# Patient Record
Sex: MALE | Race: WHITE | NOT HISPANIC OR LATINO | URBAN - METROPOLITAN AREA
[De-identification: names, ages, dates, MRNs, and addresses within clinical notes are randomized per-mention and may not be internally consistent; named-entity substitution may affect disease eponyms.]

---

## 2017-04-11 ENCOUNTER — OFFICE VISIT - HEALTHEAST (OUTPATIENT)
Dept: GERIATRICS | Facility: CLINIC | Age: 82
End: 2017-04-11

## 2017-04-11 ENCOUNTER — AMBULATORY - HEALTHEAST (OUTPATIENT)
Dept: ADMINISTRATIVE | Facility: CLINIC | Age: 82
End: 2017-04-11

## 2017-04-11 DIAGNOSIS — I25.10 CORONARY ARTERY DISEASE: ICD-10-CM

## 2017-04-11 DIAGNOSIS — E03.9 HYPOTHYROIDISM: ICD-10-CM

## 2017-04-11 DIAGNOSIS — I10 HYPERTENSION: ICD-10-CM

## 2017-04-11 DIAGNOSIS — N18.9 CHRONIC KIDNEY DISEASE: ICD-10-CM

## 2017-04-11 DIAGNOSIS — E11.9 DIABETES MELLITUS (H): ICD-10-CM

## 2017-04-11 DIAGNOSIS — I50.40 COMBINED CONGESTIVE SYSTOLIC AND DIASTOLIC HEART FAILURE (H): ICD-10-CM

## 2017-04-14 ENCOUNTER — OFFICE VISIT - HEALTHEAST (OUTPATIENT)
Dept: GERIATRICS | Facility: CLINIC | Age: 82
End: 2017-04-14

## 2017-04-14 DIAGNOSIS — I10 ESSENTIAL HYPERTENSION: ICD-10-CM

## 2017-04-14 DIAGNOSIS — I48.91 A-FIB (H): ICD-10-CM

## 2017-04-14 DIAGNOSIS — Z89.511 S/P BILATERAL BKA (BELOW KNEE AMPUTATION) (H): ICD-10-CM

## 2017-04-14 DIAGNOSIS — I50.9 CHF (CONGESTIVE HEART FAILURE) (H): ICD-10-CM

## 2017-04-14 DIAGNOSIS — E03.9 HYPOTHYROIDISM: ICD-10-CM

## 2017-04-14 DIAGNOSIS — Z89.512 S/P BILATERAL BKA (BELOW KNEE AMPUTATION) (H): ICD-10-CM

## 2017-04-14 DIAGNOSIS — N40.0 BPH (BENIGN PROSTATIC HYPERPLASIA): ICD-10-CM

## 2017-04-14 DIAGNOSIS — E11.9 DIABETES MELLITUS (H): ICD-10-CM

## 2017-04-18 ENCOUNTER — OFFICE VISIT - HEALTHEAST (OUTPATIENT)
Dept: GERIATRICS | Facility: CLINIC | Age: 82
End: 2017-04-18

## 2017-04-18 DIAGNOSIS — N18.9 CKD (CHRONIC KIDNEY DISEASE): ICD-10-CM

## 2017-04-18 DIAGNOSIS — R79.89 AZOTEMIA: ICD-10-CM

## 2017-04-18 DIAGNOSIS — I50.9 CHF (CONGESTIVE HEART FAILURE) (H): ICD-10-CM

## 2017-04-18 DIAGNOSIS — I10 ESSENTIAL HYPERTENSION: ICD-10-CM

## 2017-04-21 ENCOUNTER — OFFICE VISIT - HEALTHEAST (OUTPATIENT)
Dept: GERIATRICS | Facility: CLINIC | Age: 82
End: 2017-04-21

## 2017-04-21 DIAGNOSIS — E87.6 HYPOKALEMIA: ICD-10-CM

## 2017-04-21 DIAGNOSIS — N40.0 BPH (BENIGN PROSTATIC HYPERPLASIA): ICD-10-CM

## 2017-04-21 DIAGNOSIS — R19.5 LOOSE STOOLS: ICD-10-CM

## 2017-04-21 DIAGNOSIS — N18.9 CKD (CHRONIC KIDNEY DISEASE): ICD-10-CM

## 2017-04-25 ENCOUNTER — OFFICE VISIT - HEALTHEAST (OUTPATIENT)
Dept: GERIATRICS | Facility: CLINIC | Age: 82
End: 2017-04-25

## 2017-04-25 DIAGNOSIS — N18.9 CKD (CHRONIC KIDNEY DISEASE): ICD-10-CM

## 2017-04-25 DIAGNOSIS — E11.9 DIABETES MELLITUS (H): ICD-10-CM

## 2017-04-25 DIAGNOSIS — I10 ESSENTIAL HYPERTENSION: ICD-10-CM

## 2017-04-25 DIAGNOSIS — I50.9 CHF (CONGESTIVE HEART FAILURE) (H): ICD-10-CM

## 2017-04-28 ENCOUNTER — OFFICE VISIT - HEALTHEAST (OUTPATIENT)
Dept: GERIATRICS | Facility: CLINIC | Age: 82
End: 2017-04-28

## 2017-04-28 DIAGNOSIS — Z51.81 ANTICOAGULATION MANAGEMENT ENCOUNTER: ICD-10-CM

## 2017-04-28 DIAGNOSIS — Z79.01 ANTICOAGULATION MANAGEMENT ENCOUNTER: ICD-10-CM

## 2017-04-28 DIAGNOSIS — I48.91 A-FIB (H): ICD-10-CM

## 2017-04-28 DIAGNOSIS — N18.9 CKD (CHRONIC KIDNEY DISEASE): ICD-10-CM

## 2017-04-28 DIAGNOSIS — I50.9 CHF (CONGESTIVE HEART FAILURE) (H): ICD-10-CM

## 2017-04-28 DIAGNOSIS — E87.6 HYPOKALEMIA: ICD-10-CM

## 2017-04-28 DIAGNOSIS — Z89.511 S/P BILATERAL BKA (BELOW KNEE AMPUTATION) (H): ICD-10-CM

## 2017-04-28 DIAGNOSIS — Z89.512 S/P BILATERAL BKA (BELOW KNEE AMPUTATION) (H): ICD-10-CM

## 2017-05-02 ENCOUNTER — OFFICE VISIT - HEALTHEAST (OUTPATIENT)
Dept: GERIATRICS | Facility: CLINIC | Age: 82
End: 2017-05-02

## 2017-05-02 DIAGNOSIS — I10 ESSENTIAL HYPERTENSION: ICD-10-CM

## 2017-05-02 DIAGNOSIS — E11.9 DIABETES MELLITUS (H): ICD-10-CM

## 2017-05-02 DIAGNOSIS — I50.9 CHF (CONGESTIVE HEART FAILURE) (H): ICD-10-CM

## 2017-05-02 DIAGNOSIS — N18.9 CKD (CHRONIC KIDNEY DISEASE): ICD-10-CM

## 2017-05-05 ENCOUNTER — OFFICE VISIT - HEALTHEAST (OUTPATIENT)
Dept: GERIATRICS | Facility: CLINIC | Age: 82
End: 2017-05-05

## 2017-05-05 DIAGNOSIS — Z89.511 S/P BILATERAL BKA (BELOW KNEE AMPUTATION) (H): ICD-10-CM

## 2017-05-05 DIAGNOSIS — R53.81 PHYSICAL DECONDITIONING: ICD-10-CM

## 2017-05-05 DIAGNOSIS — E11.9 DIABETES MELLITUS (H): ICD-10-CM

## 2017-05-05 DIAGNOSIS — Z89.512 S/P BILATERAL BKA (BELOW KNEE AMPUTATION) (H): ICD-10-CM

## 2017-05-05 DIAGNOSIS — I10 ESSENTIAL HYPERTENSION: ICD-10-CM

## 2017-05-09 ENCOUNTER — OFFICE VISIT - HEALTHEAST (OUTPATIENT)
Dept: GERIATRICS | Facility: CLINIC | Age: 82
End: 2017-05-09

## 2017-05-09 DIAGNOSIS — E11.9 DIABETES MELLITUS (H): ICD-10-CM

## 2017-05-09 DIAGNOSIS — I50.9 CHF (CONGESTIVE HEART FAILURE) (H): ICD-10-CM

## 2017-05-09 DIAGNOSIS — N18.9 CKD (CHRONIC KIDNEY DISEASE): ICD-10-CM

## 2017-05-09 DIAGNOSIS — I10 ESSENTIAL HYPERTENSION: ICD-10-CM

## 2017-05-15 ENCOUNTER — AMBULATORY - HEALTHEAST (OUTPATIENT)
Dept: ADMINISTRATIVE | Facility: CLINIC | Age: 82
End: 2017-05-15

## 2017-05-16 ENCOUNTER — OFFICE VISIT - HEALTHEAST (OUTPATIENT)
Dept: GERIATRICS | Facility: CLINIC | Age: 82
End: 2017-05-16

## 2017-05-16 DIAGNOSIS — N40.0 BENIGN PROSTATIC HYPERTROPHY: ICD-10-CM

## 2017-05-16 DIAGNOSIS — Z89.511 S/P BILATERAL BKA (BELOW KNEE AMPUTATION) (H): ICD-10-CM

## 2017-05-16 DIAGNOSIS — N18.9 CHRONIC KIDNEY DISEASE: ICD-10-CM

## 2017-05-16 DIAGNOSIS — Z51.81 ANTICOAGULATION MANAGEMENT ENCOUNTER: ICD-10-CM

## 2017-05-16 DIAGNOSIS — I48.91 A-FIB (H): ICD-10-CM

## 2017-05-16 DIAGNOSIS — R31.0 GROSS HEMATURIA: ICD-10-CM

## 2017-05-16 DIAGNOSIS — Z79.01 ANTICOAGULATION MANAGEMENT ENCOUNTER: ICD-10-CM

## 2017-05-16 DIAGNOSIS — Z89.512 S/P BILATERAL BKA (BELOW KNEE AMPUTATION) (H): ICD-10-CM

## 2017-05-16 DIAGNOSIS — I10 ESSENTIAL HYPERTENSION: ICD-10-CM

## 2017-05-16 DIAGNOSIS — E11.9 TYPE 2 DIABETES MELLITUS (H): ICD-10-CM

## 2017-05-16 DIAGNOSIS — I50.9 CHF (CONGESTIVE HEART FAILURE) (H): ICD-10-CM

## 2017-05-19 ENCOUNTER — OFFICE VISIT - HEALTHEAST (OUTPATIENT)
Dept: GERIATRICS | Facility: CLINIC | Age: 82
End: 2017-05-19

## 2017-05-19 DIAGNOSIS — I50.9 CHF (CONGESTIVE HEART FAILURE) (H): ICD-10-CM

## 2017-05-19 DIAGNOSIS — E11.9 DIABETES MELLITUS (H): ICD-10-CM

## 2017-05-19 DIAGNOSIS — N18.9 CKD (CHRONIC KIDNEY DISEASE): ICD-10-CM

## 2017-05-19 DIAGNOSIS — R53.81 PHYSICAL DECONDITIONING: ICD-10-CM

## 2017-05-23 ENCOUNTER — OFFICE VISIT - HEALTHEAST (OUTPATIENT)
Dept: GERIATRICS | Facility: CLINIC | Age: 82
End: 2017-05-23

## 2017-05-23 DIAGNOSIS — I50.9 CHF (CONGESTIVE HEART FAILURE) (H): ICD-10-CM

## 2017-05-23 DIAGNOSIS — I10 ESSENTIAL HYPERTENSION: ICD-10-CM

## 2017-05-23 DIAGNOSIS — E11.9 DIABETES MELLITUS (H): ICD-10-CM

## 2017-05-23 DIAGNOSIS — R53.81 PHYSICAL DECONDITIONING: ICD-10-CM

## 2017-05-26 ENCOUNTER — OFFICE VISIT - HEALTHEAST (OUTPATIENT)
Dept: GERIATRICS | Facility: CLINIC | Age: 82
End: 2017-05-26

## 2017-05-26 DIAGNOSIS — I48.91 A-FIB (H): ICD-10-CM

## 2017-05-26 DIAGNOSIS — I50.9 CHF (CONGESTIVE HEART FAILURE) (H): ICD-10-CM

## 2017-05-26 DIAGNOSIS — I10 ESSENTIAL HYPERTENSION: ICD-10-CM

## 2017-05-26 DIAGNOSIS — E11.9 DIABETES MELLITUS (H): ICD-10-CM

## 2017-05-30 ENCOUNTER — OFFICE VISIT - HEALTHEAST (OUTPATIENT)
Dept: GERIATRICS | Facility: CLINIC | Age: 82
End: 2017-05-30

## 2017-05-30 DIAGNOSIS — E11.9 DIABETES MELLITUS (H): ICD-10-CM

## 2017-05-30 DIAGNOSIS — N40.0 BPH (BENIGN PROSTATIC HYPERPLASIA): ICD-10-CM

## 2017-05-30 DIAGNOSIS — R53.81 PHYSICAL DECONDITIONING: ICD-10-CM

## 2017-05-30 DIAGNOSIS — I50.9 CHF (CONGESTIVE HEART FAILURE) (H): ICD-10-CM

## 2017-06-02 ENCOUNTER — OFFICE VISIT - HEALTHEAST (OUTPATIENT)
Dept: GERIATRICS | Facility: CLINIC | Age: 82
End: 2017-06-02

## 2017-06-02 DIAGNOSIS — E11.9 DIABETES MELLITUS (H): ICD-10-CM

## 2017-06-02 DIAGNOSIS — R33.9 URINARY RETENTION: ICD-10-CM

## 2017-06-02 DIAGNOSIS — Z89.511 S/P BILATERAL BKA (BELOW KNEE AMPUTATION) (H): ICD-10-CM

## 2017-06-02 DIAGNOSIS — Z89.512 S/P BILATERAL BKA (BELOW KNEE AMPUTATION) (H): ICD-10-CM

## 2017-06-02 DIAGNOSIS — N40.0 BPH (BENIGN PROSTATIC HYPERPLASIA): ICD-10-CM

## 2017-06-06 ENCOUNTER — OFFICE VISIT - HEALTHEAST (OUTPATIENT)
Dept: GERIATRICS | Facility: CLINIC | Age: 82
End: 2017-06-06

## 2017-06-06 DIAGNOSIS — R53.1 GENERAL WEAKNESS: ICD-10-CM

## 2017-06-06 DIAGNOSIS — R33.9 URINARY RETENTION: ICD-10-CM

## 2017-06-06 DIAGNOSIS — I10 ESSENTIAL HYPERTENSION: ICD-10-CM

## 2017-06-06 DIAGNOSIS — E11.9 DIABETES MELLITUS (H): ICD-10-CM

## 2017-06-06 DIAGNOSIS — N40.0 BPH (BENIGN PROSTATIC HYPERPLASIA): ICD-10-CM

## 2017-06-09 ENCOUNTER — OFFICE VISIT - HEALTHEAST (OUTPATIENT)
Dept: GERIATRICS | Facility: CLINIC | Age: 82
End: 2017-06-09

## 2017-06-09 DIAGNOSIS — I48.91 A-FIB (H): ICD-10-CM

## 2017-06-09 DIAGNOSIS — E11.9 DIABETES MELLITUS (H): ICD-10-CM

## 2017-06-09 DIAGNOSIS — N40.0 BPH (BENIGN PROSTATIC HYPERPLASIA): ICD-10-CM

## 2017-06-09 DIAGNOSIS — R33.9 URINARY RETENTION: ICD-10-CM

## 2017-06-13 ENCOUNTER — OFFICE VISIT - HEALTHEAST (OUTPATIENT)
Dept: GERIATRICS | Facility: CLINIC | Age: 82
End: 2017-06-13

## 2017-06-13 DIAGNOSIS — E11.9 DIABETES MELLITUS (H): ICD-10-CM

## 2017-06-13 DIAGNOSIS — I10 ESSENTIAL HYPERTENSION: ICD-10-CM

## 2017-06-13 DIAGNOSIS — N40.0 BPH (BENIGN PROSTATIC HYPERPLASIA): ICD-10-CM

## 2017-06-13 DIAGNOSIS — R33.9 URINARY RETENTION: ICD-10-CM

## 2017-06-16 ENCOUNTER — OFFICE VISIT - HEALTHEAST (OUTPATIENT)
Dept: GERIATRICS | Facility: CLINIC | Age: 82
End: 2017-06-16

## 2017-06-16 DIAGNOSIS — Z97.8 CHRONIC INDWELLING FOLEY CATHETER: ICD-10-CM

## 2017-06-16 DIAGNOSIS — R33.9 URINARY RETENTION: ICD-10-CM

## 2017-06-16 DIAGNOSIS — E11.9 DIABETES MELLITUS (H): ICD-10-CM

## 2017-06-16 DIAGNOSIS — I10 ESSENTIAL HYPERTENSION: ICD-10-CM

## 2017-06-20 ENCOUNTER — OFFICE VISIT - HEALTHEAST (OUTPATIENT)
Dept: GERIATRICS | Facility: CLINIC | Age: 82
End: 2017-06-20

## 2017-06-20 DIAGNOSIS — Z97.8 CHRONIC INDWELLING FOLEY CATHETER: ICD-10-CM

## 2017-06-20 DIAGNOSIS — R53.81 PHYSICAL DECONDITIONING: ICD-10-CM

## 2017-06-20 DIAGNOSIS — E11.9 DIABETES MELLITUS (H): ICD-10-CM

## 2017-06-20 DIAGNOSIS — I10 ESSENTIAL HYPERTENSION: ICD-10-CM

## 2017-06-23 ENCOUNTER — OFFICE VISIT - HEALTHEAST (OUTPATIENT)
Dept: GERIATRICS | Facility: CLINIC | Age: 82
End: 2017-06-23

## 2017-06-23 DIAGNOSIS — E11.9 DIABETES MELLITUS (H): ICD-10-CM

## 2017-06-23 DIAGNOSIS — Z97.8 CHRONIC INDWELLING FOLEY CATHETER: ICD-10-CM

## 2017-06-23 DIAGNOSIS — R53.81 PHYSICAL DECONDITIONING: ICD-10-CM

## 2017-06-23 DIAGNOSIS — I50.9 CHF (CONGESTIVE HEART FAILURE) (H): ICD-10-CM

## 2017-06-27 ENCOUNTER — OFFICE VISIT - HEALTHEAST (OUTPATIENT)
Dept: GERIATRICS | Facility: CLINIC | Age: 82
End: 2017-06-27

## 2017-06-27 DIAGNOSIS — N18.9 CKD (CHRONIC KIDNEY DISEASE): ICD-10-CM

## 2017-06-27 DIAGNOSIS — E11.9 DIABETES MELLITUS (H): ICD-10-CM

## 2017-06-27 DIAGNOSIS — I10 ESSENTIAL HYPERTENSION: ICD-10-CM

## 2017-06-27 DIAGNOSIS — Z97.8 CHRONIC INDWELLING FOLEY CATHETER: ICD-10-CM

## 2017-06-29 ENCOUNTER — AMBULATORY - HEALTHEAST (OUTPATIENT)
Dept: GERIATRICS | Facility: CLINIC | Age: 82
End: 2017-06-29

## 2021-05-31 VITALS — BODY MASS INDEX: 30.4 KG/M2 | WEIGHT: 218 LBS

## 2021-06-10 NOTE — PROGRESS NOTES
[facility]    Facility:   [facility] [[id number]]   Code Status: FULL CODE      CHIEF COMPLAINT/REASON FOR VISIT:  Chief Complaint   Patient presents with     Follow Up     rehab       HISTORY:      HPI: Maxi is a 81 y.o. male who I had a chance to revisit with again today secondary to his chronic medical conditions including his most recent hospitalization March 22 secondary to acute on chronic combined systolic and diastolic heart failure in the setting of CAD.  He currently has been improving getting a little more therapy.  Recently did see the rectal doctor secondary to questionable hemorrhoid they did give him some ointments.  He does state it does feel better.  He does have CKD recheck in the [facility] on March 8.  He also is on Coumadin 3 mg.  He also has hypokalemia recently potassium was increased 30 mEq 3 times a day.  Blood sugars .    Past Medical History:   Diagnosis Date     Abnormality of gait      Acute on chronic combined systolic (congestive) and diastolic (congestive) heart failure      Biventricular ICD (implantable cardioverter-defibrillator) in place      CAD (coronary artery disease)      CKD (chronic kidney disease), stage III      Closed nondisplaced fracture of left ischium      Diabetes mellitus      Epigastric pain      Gallstone pancreatitis      GI bleed      Gouty arthropathy      Hyperlipidemia      Hypertension      Hyponatremia      Hypothyroidism      Insomnia      Ischemic dilated cardiomyopathy      Mitral insufficiency      Neuropathy, diabetic      Non-rheumatic tricuspid valve insufficiency      Obesity      Paroxysmal atrial fibrillation      Peripheral artery disease      Reaven's syndrome      S/P bilateral below knee amputation      Sleep apnea      Urinary retention              Family History   Problem Relation Age of Onset     Other Other      there is a positive family hx of coronary disease     Social History     Social  History     Marital status:      Spouse name: N/A     Number of children: N/A     Years of education: N/A     Social History Main Topics     Smoking status: Former Smoker     Packs/day: 2.00     Years: 3.00     Types: Cigarettes     Quit date: 1/1/1957     Smokeless tobacco: Never Used     Alcohol use No     Drug use: No     Sexual activity: Not on file     Other Topics Concern     Not on file     Social History Narrative         Review of Systems  Currently denies chills fever coughing wheezing chest pain dizziness flulike symptoms rashes sores stiff neck or swollen glands. A history of combined systolic and diastolic congestive heart failure diabetes hypertension A. fib chronic kidney disease hypothyroidism BPH.    Current Outpatient Prescriptions   Medication Sig     acetaminophen (TYLENOL) 650 MG CR tablet Take 650 mg by mouth every 4 (four) hours as needed.     allopurinol (ZYLOPRIM) 300 MG tablet Take 300 mg by mouth daily.     aspirin 81 mg chewable tablet Chew 81 mg daily.     atorvastatin (LIPITOR) 20 MG tablet Take 20 mg by mouth daily with supper.     bumetanide (BUMEX) 2 MG tablet Take 4 mg by mouth 2 (two) times a day.     carvedilol (COREG) 25 MG tablet Take 12.5 mg by mouth 2 (two) times a day with meals.     clopidogrel (PLAVIX) 75 mg tablet Take 75 mg by mouth daily.     finasteride (PROSCAR) 5 mg tablet Take 5 mg by mouth daily.     HYDROcodone-acetaminophen 5-325 mg per tablet Take 2 tablets by mouth every 8 (eight) hours as needed.     insulin NPH human recomb (NOVOLIN N INNOLET/HUMULIN N KWIKPEN) 100 unit/mL (3 mL) pen Inject 18 Units under the skin daily before supper.     insulin NPH human recomb (NOVOLIN N INNOLET/HUMULIN N KWIKPEN) 100 unit/mL (3 mL) pen Inject 24 Units under the skin Daily before breakfast.     insulin regular (NOVOLIN R) 100 unit/mL injection Inject 10 Units under the skin 2 (two) times a day before meals.     isosorbide mononitrate (IMDUR) 60 MG 24 hr tablet Take  60 mg by mouth daily.     levothyroxine (SYNTHROID, LEVOTHROID) 25 MCG tablet Take 50 mcg by mouth daily.     magnesium oxide (MAG-OX) 400 mg tablet Take 400 mg by mouth daily.     miconazole (MICOTIN) 2 % powder Apply 1 application topically 2 (two) times a day.     nitroglycerin (NITROSTAT) 0.4 MG SL tablet Place 0.4 mg under the tongue every 5 (five) minutes as needed.     nystatin, bulk, 10 billion unit Powd Apply 1 application topically 2 (two) times a day as needed. Apply to  bilateral groin until rash resolved     peg 400-propylene glycol (SYSTANE) 0.4-0.3 % Drop Administer 1 drop to both eyes every hour as needed.     potassium chloride SA (K-DUR,KLOR-CON) 20 MEQ tablet Take 20 mEq by mouth 2 (two) times a day. \     tamsulosin (FLOMAX) 0.4 mg Cp24 Take 0.4 mg by mouth 2 (two) times a day.     traZODone (DESYREL) 50 MG tablet Take 100 mg by mouth daily.     WARFARIN SODIUM (WARFARIN ORAL) Take by mouth. 5mg on Sun, Wed, Sat; 2.5 mg all other days       .  Vitals:    05/02/17 2036   BP: 113/68   Pulse: 90   Resp: 18   Temp: 97.6  F (36.4  C)       Physical Exam   Constitutional: No distress.   HENT:   Head: Normocephalic.   Eyes: Pupils are equal, round, and reactive to light.   Neck: Neck supple. No thyromegaly present.   Cardiovascular:   Irregularity. Mitral valve.   Pulmonary/Chest: Breath sounds normal.   Abdominal: Bowel sounds are normal. There is no tenderness. There is no guarding.   Genitourinary:   Genitourinary Comments: BPH. lino  Musculoskeletal:   Bilateral below-knee amputations with prosthetics. Generalized weakness.   Lymphadenopathy:   He has no cervical adenopathy.   Neurological: He is alert.   Skin: Skin is warm and dry. No rash noted.      LABS:   Results for orders placed or performed in visit on 05/02/17   Basic Metabolic Panel   Result Value Ref Range    Sodium 137 136 - 145 mmol/L    Potassium 4.2 3.5 - 5.0 mmol/L    Chloride 93 (L) 98 - 107 mmol/L    CO2 36 (H) 22 - 31 mmol/L     Anion Gap, Calculation 8 5 - 18 mmol/L    Glucose 220 (H) 70 - 125 mg/dL    Calcium 9.2 8.5 - 10.5 mg/dL    BUN 74 (H) 8 - 28 mg/dL    Creatinine 1.55 (H) 0.70 - 1.30 mg/dL    GFR MDRD Af Amer 52 (L) >60 mL/min/1.73m2    GFR MDRD Non Af Amer 43 (L) >60 mL/min/1.73m2       Lab Results   Component Value Date    WBC 9.5 06/16/2016    HGB 10.8 (L) 04/25/2017    HCT 34.9 (L) 06/16/2016    MCV 99 06/16/2016     06/16/2016         ASSESSMENT:      ICD-10-CM    1. CKD (chronic kidney disease) N18.9    2. Diabetes mellitus E11.9    3. Essential hypertension I10    4. CHF (congestive heart failure) I50.9        PLAN:    Recheck in the [facility] on May 8.  Continue to manage and follow.  Also continue to monitor blood sugars blood pressures and overall status including therapy.      Electronically signed by: Michael Duane Johnson, CNP

## 2021-06-10 NOTE — PROGRESS NOTES
[facility]    Facility:   [facility] [[id number]]   Code Status: FULL CODE      CHIEF COMPLAINT/REASON FOR VISIT:  Chief Complaint   Patient presents with     Follow Up     rehab       HISTORY:      HPI: Maxi is a 81 y.o. male who I had the pleasure of visiting with today secondary to his hospitalization March 22 secondary to acute on chronic combined systolic and diastolic heart failure in the setting of CAD.  Currently enrolled in therapy.  He does feel he is making pretty good progress.  No CHF exacerbation.  I think at this point he is actually doing quite well we will change his Bumex to 2 mg twice daily also decrease potassium 30 mEq twice daily rather than 3 times daily.  He has been normotensive and afebrile.  Uses trazodone for sleep.  Does have urinary retention and a Bullock catheter and does take Flomax.  Did have some laboratory studies done on May 8 see results below I did talk about that.  Also was on Coumadin 2.5 mg recheck pro time on May 12.  Does not use oxygen during the day will use it at night.    Past Medical History:   Diagnosis Date     Abnormality of gait      Acute on chronic combined systolic (congestive) and diastolic (congestive) heart failure      Biventricular ICD (implantable cardioverter-defibrillator) in place      CAD (coronary artery disease)      CKD (chronic kidney disease), stage III      Closed nondisplaced fracture of left ischium      Diabetes mellitus      Epigastric pain      Gallstone pancreatitis      GI bleed      Gouty arthropathy      Hyperlipidemia      Hypertension      Hyponatremia      Hypothyroidism      Insomnia      Ischemic dilated cardiomyopathy      Mitral insufficiency      Neuropathy, diabetic      Non-rheumatic tricuspid valve insufficiency      Obesity      Paroxysmal atrial fibrillation      Peripheral artery disease      Reaven's syndrome      S/P bilateral below knee amputation      Sleep apnea      Urinary  retention              Family History   Problem Relation Age of Onset     Other Other      there is a positive family hx of coronary disease     Social History     Social History     Marital status:      Spouse name: N/A     Number of children: N/A     Years of education: N/A     Social History Main Topics     Smoking status: Former Smoker     Packs/day: 2.00     Years: 3.00     Types: Cigarettes     Quit date: 1/1/1957     Smokeless tobacco: Never Used     Alcohol use No     Drug use: No     Sexual activity: Not on file     Other Topics Concern     Not on file     Social History Narrative         Review of Systems  Currently denies chills fever coughing wheezing chest pain dizziness flulike symptoms rashes sores stiff neck or swollen glands. A history of combined systolic and diastolic congestive heart failure diabetes hypertension A. fib chronic kidney disease hypothyroidism BPH.    Current Outpatient Prescriptions   Medication Sig     acetaminophen (TYLENOL) 650 MG CR tablet Take 650 mg by mouth every 4 (four) hours as needed.     allopurinol (ZYLOPRIM) 300 MG tablet Take 300 mg by mouth daily.     aspirin 81 mg chewable tablet Chew 81 mg daily.     atorvastatin (LIPITOR) 20 MG tablet Take 20 mg by mouth daily with supper.     bumetanide (BUMEX) 2 MG tablet Take 4 mg by mouth 2 (two) times a day.     carvedilol (COREG) 25 MG tablet Take 12.5 mg by mouth 2 (two) times a day with meals.     clopidogrel (PLAVIX) 75 mg tablet Take 75 mg by mouth daily.     finasteride (PROSCAR) 5 mg tablet Take 5 mg by mouth daily.     HYDROcodone-acetaminophen 5-325 mg per tablet Take 2 tablets by mouth every 8 (eight) hours as needed.     insulin NPH human recomb (NOVOLIN N INNOLET/HUMULIN N KWIKPEN) 100 unit/mL (3 mL) pen Inject 18 Units under the skin daily before supper.     insulin NPH human recomb (NOVOLIN N INNOLET/HUMULIN N KWIKPEN) 100 unit/mL (3 mL) pen Inject 24 Units under the skin Daily before breakfast.      insulin regular (NOVOLIN R) 100 unit/mL injection Inject 10 Units under the skin 2 (two) times a day before meals.     isosorbide mononitrate (IMDUR) 60 MG 24 hr tablet Take 60 mg by mouth daily.     levothyroxine (SYNTHROID, LEVOTHROID) 25 MCG tablet Take 50 mcg by mouth daily.     magnesium oxide (MAG-OX) 400 mg tablet Take 400 mg by mouth daily.     miconazole (MICOTIN) 2 % powder Apply 1 application topically 2 (two) times a day.     nitroglycerin (NITROSTAT) 0.4 MG SL tablet Place 0.4 mg under the tongue every 5 (five) minutes as needed.     nystatin, bulk, 10 billion unit Powd Apply 1 application topically 2 (two) times a day as needed. Apply to  bilateral groin until rash resolved     peg 400-propylene glycol (SYSTANE) 0.4-0.3 % Drop Administer 1 drop to both eyes every hour as needed.     potassium chloride SA (K-DUR,KLOR-CON) 20 MEQ tablet Take 20 mEq by mouth 2 (two) times a day. \     tamsulosin (FLOMAX) 0.4 mg Cp24 Take 0.4 mg by mouth 2 (two) times a day.     traZODone (DESYREL) 50 MG tablet Take 100 mg by mouth daily.     WARFARIN SODIUM (WARFARIN ORAL) Take by mouth. 5mg on Sun, Wed, Sat; 2.5 mg all other days       .  Vitals:    05/09/17 1613   BP: 131/81   Pulse: 92   Resp: 18       Physical Exam  Constitutional: No distress.   HENT:   Head: Normocephalic.   Eyes: Pupils are equal, round, and reactive to light.   Neck: Neck supple. No thyromegaly present.   Cardiovascular:   Irregularity. Mitral valve.   Pulmonary/Chest: Breath sounds normal.   Abdominal: Bowel sounds are normal. There is no tenderness. There is no guarding.   Genitourinary:   Genitourinary Comments: BPH. lino  Musculoskeletal:   Bilateral below-knee amputations with prosthetics. Generalized weakness.   Lymphadenopathy:   He has no cervical adenopathy.   Neurological: He is alert.   Skin: Skin is warm and dry. No rash noted.       LABS:   Lab Results   Component Value Date    WBC 9.5 06/16/2016    HGB 10.8 (L) 04/25/2017    HCT  34.9 (L) 06/16/2016    MCV 99 06/16/2016     06/16/2016     Results for orders placed or performed in visit on 05/08/17   Basic Metabolic Panel   Result Value Ref Range    Sodium 137 136 - 145 mmol/L    Potassium 4.6 3.5 - 5.0 mmol/L    Chloride 97 (L) 98 - 107 mmol/L    CO2 29 22 - 31 mmol/L    Anion Gap, Calculation 11 5 - 18 mmol/L    Glucose 154 (H) 70 - 125 mg/dL    Calcium 9.0 8.5 - 10.5 mg/dL    BUN 54 (H) 8 - 28 mg/dL    Creatinine 1.57 (H) 0.70 - 1.30 mg/dL    GFR MDRD Af Amer 52 (L) >60 mL/min/1.73m2    GFR MDRD Non Af Amer 43 (L) >60 mL/min/1.73m2           ASSESSMENT:      ICD-10-CM    1. CHF (congestive heart failure) I50.9    2. Diabetes mellitus E11.9    3. CKD (chronic kidney disease) N18.9    4. Essential hypertension I10        PLAN:    Overall the BMP has improved.  We will now decrease the Bumex to 2 mg twice daily decreased potassium 30 mEq twice daily.  Next BMP on May 15.  Also Coumadin 2.5 mg recheck a pro time on May 12.        Electronically signed by: Michael Duane Johnson, CNP

## 2021-06-10 NOTE — PROGRESS NOTES
[facility]    Facility:   [facility] [[id number]]   Code Status: FULL CODE      CHIEF COMPLAINT/REASON FOR VISIT:  Chief Complaint   Patient presents with     Follow Up     labs, K, stool,        HISTORY:      HPI: Maxi is a 81 y.o. male who I had the pleasure of visiting with again secondary to his hospitalization March 22 - April 10 secondary to acute on chronic combined systolic and diastolic congestive heart failure in the setting of CAD and severe mitral regurgitation.  We been doing some laboratory studies on him and he does have hypokalemia.  Earlier in the week discontinue the metolazone gave him a couple extra doses of potassium 20 mEq currently is on 15 mEq twice daily will now increase it to 20 mEq twice daily and recheck again next week.  He did have another BMP this week comparable to the previous one on the 17th except that the creatinine is now down 1.68 where it was 1.99 at this point he is willing to trial an IV so we will give him a few liters over the weekend.  Also decreased his allopurinol 100 mg.  He has been normotensive and afebrile.  Does continue with the Bumex 2 mg twice a day.  They did a trial study for Bullock discontinuation he failed that to the Bullock is back in.  He is getting stool softeners did have loose stool last couple of days someone on the staff thought that there could be some blood in there is no history of hemorrhoid no pain through the rectum but due to the fact that someone recognize blood in his stool will send him off to gastroenterology.  Blood sugars ranging .  He is on NPH and regular insulin no changes.  His last hemoglobin the 17th was 10.3 will recheck again next week.  He is on Coumadin now at 3 mg rechecking next week.  Due to the kidney function test and he does admit now that he would like to trial an IV.  Earlier in the week he wanted to defer that we will go ahead and give him a couple of liters to at the D5 and  one half normal saline and then another one over the third 1 with adding 10 KCl and then recheck the BMP next week.  Not having any significant pain although does take Vicodin 1 or 2 doses per day.  For sleep is on trazodone 100 mg.    Past Medical History:   Diagnosis Date     Abnormality of gait      Acute on chronic combined systolic (congestive) and diastolic (congestive) heart failure      Biventricular ICD (implantable cardioverter-defibrillator) in place      CAD (coronary artery disease)      CKD (chronic kidney disease), stage III      Closed nondisplaced fracture of left ischium      Diabetes mellitus      Epigastric pain      Gallstone pancreatitis      GI bleed      Gouty arthropathy      Hyperlipidemia      Hypertension      Hyponatremia      Hypothyroidism      Insomnia      Ischemic dilated cardiomyopathy      Mitral insufficiency      Neuropathy, diabetic      Non-rheumatic tricuspid valve insufficiency      Obesity      Paroxysmal atrial fibrillation      Peripheral artery disease      Reaven's syndrome      S/P bilateral below knee amputation      Sleep apnea      Urinary retention              Family History   Problem Relation Age of Onset     Other Other      there is a positive family hx of coronary disease     Social History     Social History     Marital status:      Spouse name: N/A     Number of children: N/A     Years of education: N/A     Social History Main Topics     Smoking status: Former Smoker     Packs/day: 2.00     Years: 3.00     Types: Cigarettes     Quit date: 1/1/1957     Smokeless tobacco: Never Used     Alcohol use No     Drug use: No     Sexual activity: Not on file     Other Topics Concern     Not on file     Social History Narrative         Review of Systems  Currently denies chills fever coughing wheezing chest pain dizziness flulike symptoms rashes sores stiff neck or swollen glands. A history of combined systolic and diastolic congestive heart failure diabetes  hypertension A. fib chronic kidney disease hypothyroidism BPH.      Current Outpatient Prescriptions:      acetaminophen (TYLENOL) 650 MG CR tablet, Take 650 mg by mouth every 4 (four) hours as needed., Disp: , Rfl:      allopurinol (ZYLOPRIM) 300 MG tablet, Take 300 mg by mouth daily., Disp: , Rfl:      aspirin 81 mg chewable tablet, Chew 81 mg daily., Disp: , Rfl:      atorvastatin (LIPITOR) 20 MG tablet, Take 20 mg by mouth daily with supper., Disp: , Rfl:      bumetanide (BUMEX) 2 MG tablet, Take 4 mg by mouth 2 (two) times a day., Disp: , Rfl:      carvedilol (COREG) 25 MG tablet, Take 12.5 mg by mouth 2 (two) times a day with meals., Disp: , Rfl:      clopidogrel (PLAVIX) 75 mg tablet, Take 75 mg by mouth daily., Disp: , Rfl:      finasteride (PROSCAR) 5 mg tablet, Take 5 mg by mouth daily., Disp: , Rfl:      hydrALAZINE (APRESOLINE) 25 MG tablet, Take 12.5 mg by mouth 2 (two) times a day., Disp: , Rfl:      HYDROcodone-acetaminophen 5-325 mg per tablet, Take 2 tablets by mouth every 8 (eight) hours as needed., Disp: , Rfl:      insulin NPH human recomb (NOVOLIN N INNOLET/HUMULIN N KWIKPEN) 100 unit/mL (3 mL) pen, Inject 18 Units under the skin daily before supper., Disp: , Rfl:      insulin NPH human recomb (NOVOLIN N INNOLET/HUMULIN N KWIKPEN) 100 unit/mL (3 mL) pen, Inject 24 Units under the skin Daily before breakfast., Disp: , Rfl:      insulin regular (NOVOLIN R) 100 unit/mL injection, Inject 10 Units under the skin 2 (two) times a day before meals., Disp: , Rfl:      isosorbide mononitrate (IMDUR) 60 MG 24 hr tablet, Take 60 mg by mouth daily., Disp: , Rfl:      levothyroxine (SYNTHROID, LEVOTHROID) 25 MCG tablet, Take 50 mcg by mouth daily., Disp: , Rfl:      magnesium oxide (MAG-OX) 400 mg tablet, Take 400 mg by mouth daily., Disp: , Rfl:      miconazole (MICOTIN) 2 % powder, Apply 1 application topically 2 (two) times a day., Disp: , Rfl:      nitroglycerin (NITROSTAT) 0.4 MG SL tablet, Place 0.4 mg  under the tongue every 5 (five) minutes as needed., Disp: , Rfl:      nystatin, bulk, 10 billion unit Powd, Apply 1 application topically 2 (two) times a day as needed. Apply to  bilateral groin until rash resolved, Disp: , Rfl:      peg 400-propylene glycol (SYSTANE) 0.4-0.3 % Drop, Administer 1 drop to both eyes every hour as needed., Disp: , Rfl:      potassium chloride SA (K-DUR,KLOR-CON) 20 MEQ tablet, Take 20 mEq by mouth 2 (two) times a day. \, Disp: , Rfl:      tamsulosin (FLOMAX) 0.4 mg Cp24, Take 0.4 mg by mouth 2 (two) times a day., Disp: , Rfl:      traZODone (DESYREL) 50 MG tablet, Take 100 mg by mouth daily., Disp: , Rfl:      WARFARIN SODIUM (WARFARIN ORAL), Take by mouth. 5mg on Sun, Wed, Sat; 2.5 mg all other days, Disp: , Rfl:     .  Vitals:    04/21/17 0842   BP: 130/71   Pulse: 68   Resp: 20   Temp: 96.8  F (36  C)       Physical Exam  Constitutional: No distress.   HENT:   Head: Normocephalic.   Eyes: Pupils are equal, round, and reactive to light.   Neck: Neck supple. No thyromegaly present.   Cardiovascular:   Irregularity. Mitral valve.   Pulmonary/Chest: Breath sounds normal.   Abdominal: Bowel sounds are normal. There is no tenderness. There is no guarding.   Genitourinary:   Genitourinary Comments: BPH.   Musculoskeletal:   Bilateral below-knee amputations with prosthetics. Generalized weakness.   Lymphadenopathy:   He has no cervical adenopathy.   Neurological: He is alert.   Skin: Skin is warm and dry. No rash noted.      LABS:   Lab Results   Component Value Date    WBC 9.5 06/16/2016    HGB 10.1 (L) 04/17/2017    HCT 34.9 (L) 06/16/2016    MCV 99 06/16/2016     06/16/2016     Results for orders placed or performed in visit on 04/20/17   Basic Metabolic Panel   Result Value Ref Range    Sodium 135 (L) 136 - 145 mmol/L    Potassium 3.0 (L) 3.5 - 5.0 mmol/L    Chloride 80 (L) 98 - 107 mmol/L    CO2 45 (HH) 22 - 31 mmol/L    Anion Gap, Calculation 10 5 - 18 mmol/L    Glucose 150 (H)  70 - 125 mg/dL    Calcium 9.4 8.5 - 10.5 mg/dL     (H) 8 - 28 mg/dL    Creatinine 1.68 (H) 0.70 - 1.30 mg/dL    GFR MDRD Af Amer 48 (L) >60 mL/min/1.73m2    GFR MDRD Non Af Amer 39 (L) >60 mL/min/1.73m2    potassium level April 18 3.1.        ASSESSMENT:      ICD-10-CM    1. Hypokalemia E87.6    2. CKD (chronic kidney disease) N18.9    3. Loose stools R19.5    4. BPH (benign prostatic hyperplasia) N40.0        PLAN:    At this time we will give him an IV 3 L.  Continue to monitor the blood sugars the blood pressures his overall status his output also next week checking the hemoglobin and the BMP.  Coumadin 3 mg rechecking on the 25th a pro time.  And then also he still is on oxygen per nasal cannula.  He did fail a Bullock catheter trial removal.  Also send him off to gastroenterology for questionable melena.    For documentation purposes total visit was over 40 minutes to which over 50% was spent with the patient going over his labs his therapy his medications as well as overall current symptoms and also putting an IV fluids getting future laboratory studies as well as coordination of care.        Electronically signed by: Michael Duane Johnson, CNP

## 2021-06-10 NOTE — PROGRESS NOTES
[facility]    Facility:   [facility] [[id number]]   Code Status: FULL CODE      CHIEF COMPLAINT/REASON FOR VISIT:  Chief Complaint   Patient presents with     Follow Up     rehab       HISTORY:      HPI: Maxi is a 81 y.o. male who I had a chance to revisit with again today secondary to his most recent hospitalization May 11 secondary to hematuria.  He also has a history of combined systolic and diastolic heart failure secondary to ischemic dilated cardiomyopathy status post biventricular ICD secondary to severe tricuspid and mitral regurgitation.  He currently has been asymptomatic and his Bullock has been clear.  He still is on Bumex.  Last BMP was the 18th.  He is on Coumadin.  For sleep he is on trazodone.  For his diabetes blood sugars ranging 164-270 but most of them less than 200 and only right now he has been on sliding scale insulin.  We talked about his prior use of NPH rather than using sliding scale and at this point he feels comfortable just with the sliding scale but also being very much aware after our conversation today that we do not control diabetes with sliding scale insulin but he said he will follow-up soon when he gets discharged.  There is no discharge date yet set but he does feel he is doing well and therapy states that he is able to transfer he has improved significantly and he thinks he should be able to go home soon.    Past Medical History:   Diagnosis Date     Abnormality of gait      Acute on chronic combined systolic (congestive) and diastolic (congestive) heart failure      Biventricular ICD (implantable cardioverter-defibrillator) in place      BPH (benign prostatic hyperplasia)      CAD (coronary artery disease)      CKD (chronic kidney disease), stage III      Closed nondisplaced fracture of left ischium      Diabetes mellitus      Epigastric pain      Gallstone pancreatitis      GI bleed      Gouty arthropathy      Gross hematuria       Hyperlipidemia      Hypertension      Hyponatremia      Hypothyroidism      Insomnia      Ischemic dilated cardiomyopathy      Mitral insufficiency      Neuropathy, diabetic      Non-rheumatic tricuspid valve insufficiency      Obesity      Paroxysmal atrial fibrillation      Peripheral artery disease      Reaven's syndrome      S/P bilateral below knee amputation      Sleep apnea      Urinary retention              Family History   Problem Relation Age of Onset     Other Other      there is a positive family hx of coronary disease     Social History     Social History     Marital status:      Spouse name: N/A     Number of children: N/A     Years of education: N/A     Social History Main Topics     Smoking status: Former Smoker     Packs/day: 2.00     Years: 3.00     Types: Cigarettes     Quit date: 1/1/1957     Smokeless tobacco: Never Used     Alcohol use No     Drug use: No     Sexual activity: Not on file     Other Topics Concern     Not on file     Social History Narrative         Review of Systems  Currently denies chills fever coughing wheezing chest pain dizziness flulike symptoms rashes sores stiff neck or swollen glands. A history of combined systolic and diastolic congestive heart failure diabetes hypertension A. fib chronic kidney disease hypothyroidism BPH.        Current Outpatient Prescriptions:      acetaminophen (TYLENOL) 650 MG CR tablet, Take 650 mg by mouth every 8 (eight) hours as needed for pain. , Disp: , Rfl:      allopurinol (ZYLOPRIM) 300 MG tablet, Take 100 mg by mouth daily. , Disp: , Rfl:      atorvastatin (LIPITOR) 20 MG tablet, Take 20 mg by mouth daily with supper., Disp: , Rfl:      bumetanide (BUMEX) 2 MG tablet, Take 4 mg by mouth 2 (two) times a day., Disp: , Rfl:      carvedilol (COREG) 25 MG tablet, Take 12.5 mg by mouth 2 (two) times a day with meals., Disp: , Rfl:      cefdinir (OMNICEF) 300 MG capsule, Take 300 mg by mouth 2 (two) times a day., Disp: , Rfl:       clopidogrel (PLAVIX) 75 mg tablet, Take 75 mg by mouth daily., Disp: , Rfl:      finasteride (PROSCAR) 5 mg tablet, Take 5 mg by mouth daily., Disp: , Rfl:      insulin aspart (NOVOLOG) 100 unit/mL injection pen, 1u per 15g of carbohydrates plus sliding scale., Disp: , Rfl:      insulin aspart (NOVOLOG) 100 unit/mL injection pen, Inject under the skin 3 (three) times a day with meals. Per sliding scale: 70 - 149: No corrective insulin, 150 - 199:2 units, 200 - 249:4 units, 250 - 299: 6 units, 300 - 349: 8 units, 350 or greater:10 units and notify MD, Disp: , Rfl:      isosorbide mononitrate (IMDUR) 60 MG 24 hr tablet, Take 60 mg by mouth daily., Disp: , Rfl:      levothyroxine (SYNTHROID, LEVOTHROID) 25 MCG tablet, Take 50 mcg by mouth Daily before breakfast. , Disp: , Rfl:      magnesium oxide (MAG-OX) 400 mg tablet, Take 400 mg by mouth daily., Disp: , Rfl:      miconazole (MICOTIN) 2 % powder, Apply 1 application topically 2 (two) times a day., Disp: , Rfl:      nitroglycerin (NITROSTAT) 0.4 MG SL tablet, Place 0.4 mg under the tongue every 5 (five) minutes as needed., Disp: , Rfl:      nystatin, bulk, 10 billion unit Powd, Apply 1 application topically 2 (two) times a day as needed. Apply to  bilateral groin until rash resolved, Disp: , Rfl:      peg 400-propylene glycol (SYSTANE) 0.4-0.3 % Drop, Administer 1 drop to both eyes every hour as needed., Disp: , Rfl:      potassium chloride SA (K-DUR,KLOR-CON) 20 MEQ tablet, Take 30 mEq by mouth 2 (two) times a day with meals. , Disp: , Rfl:      senna (SENOKOT) 8.6 mg tablet, Take 1 tablet by mouth 2 (two) times a day., Disp: , Rfl:      SOD PHOS,M-B/NA PHOS,DI-BA (FLEET ENEMA RECT), Insert 1 enema into the rectum daily as needed., Disp: , Rfl:      tamsulosin (FLOMAX) 0.4 mg Cp24, Take 0.4 mg by mouth 2 (two) times a day., Disp: , Rfl:      traZODone (DESYREL) 50 MG tablet, Take 100 mg by mouth at bedtime. , Disp: , Rfl:      WARFARIN SODIUM (WARFARIN ORAL), Take  by mouth. 2.5mg daily  5mg on Sun, Wed, Sat; 2.5 mg all other days, Disp: , Rfl:   .  Vitals:    05/23/17 1704   BP: 112/62   Pulse: 93   Resp: 18   Temp: 98  F (36.7  C)       Physical Exam    Constitutional: No distress.   HENT:   Head: Normocephalic.   Eyes: Pupils are equal, round, and reactive to light.   Neck: Neck supple. No thyromegaly present.   Cardiovascular:   Irregularity. Mitral valve.   Pulmonary/Chest: Breath sounds normal.   Abdominal: Bowel sounds are normal. There is no tenderness. There is no guarding.   Genitourinary:   Genitourinary Comments: BPH. lino  Musculoskeletal:   Bilateral below-knee amputations with prosthetics. Generalized weakness.   Lymphadenopathy:   He has no cervical adenopathy.   Neurological: He is alert.   Skin: Skin is warm and dry. No rash noted.           LABS:   Results for orders placed or performed in visit on 05/18/17   Basic Metabolic Panel   Result Value Ref Range    Sodium 137 136 - 145 mmol/L    Potassium 4.2 3.5 - 5.0 mmol/L    Chloride 96 (L) 98 - 107 mmol/L    CO2 31 22 - 31 mmol/L    Anion Gap, Calculation 10 5 - 18 mmol/L    Glucose 169 (H) 70 - 125 mg/dL    Calcium 8.8 8.5 - 10.5 mg/dL    BUN 50 (H) 8 - 28 mg/dL    Creatinine 1.22 0.70 - 1.30 mg/dL    GFR MDRD Af Amer >60 >60 mL/min/1.73m2    GFR MDRD Non Af Amer 57 (L) >60 mL/min/1.73m2       Lab Results   Component Value Date    WBC 9.5 06/16/2016    HGB 10.8 (L) 04/25/2017    HCT 34.9 (L) 06/16/2016    MCV 99 06/16/2016     06/16/2016     No results found for: HGBA1C      ASSESSMENT:      ICD-10-CM    1. Diabetes mellitus E11.9    2. CHF (congestive heart failure) I50.9    3. Essential hypertension I10    4. Physical deconditioning R53.81        PLAN:    At this time I would like him to go back on his NPH but he would like to hold off on that for now.  His next pro time is May 24.  Continue to manage and follow.  His rectal fissure and also does seem to be improving.    Electronically signed by:  Michael Duane Johnson, CNP

## 2021-06-10 NOTE — PROGRESS NOTES
[facility]    Facility:   [facility] [[id number]]   Code Status: FULL CODE      CHIEF COMPLAINT/REASON FOR VISIT:  Chief Complaint   Patient presents with     Follow Up     rehab, dm       HISTORY:      HPI: Maxi is a 81 y.o. male who I had the pleasure to visit with.  Actually doing quite well making excellent progress.  Blood sugars running 1 .  Adjusting his Coumadin 3 mg rechecking a pro time next Friday the second.  Initially his slums was 18/30 they are going to recheck it since he does seem to be clearing quite nicely.  He has been normotensive and afebrile.  Overall he does feel like he has made excellent progress from his hospitalization May 11 secondary to history of systolic and diastolic heart failure secondary to ischemic dilated cardiomyopathy status post biventricular ICD secondary to tricuspid and mitral regurgitation.  Has a Bullock catheter.  No hematuria.    Past Medical History:   Diagnosis Date     Abnormality of gait      Acute on chronic combined systolic (congestive) and diastolic (congestive) heart failure      Biventricular ICD (implantable cardioverter-defibrillator) in place      BPH (benign prostatic hyperplasia)      CAD (coronary artery disease)      CKD (chronic kidney disease), stage III      Closed nondisplaced fracture of left ischium      Diabetes mellitus      Epigastric pain      Gallstone pancreatitis      GI bleed      Gouty arthropathy      Gross hematuria      Hyperlipidemia      Hypertension      Hyponatremia      Hypothyroidism      Insomnia      Ischemic dilated cardiomyopathy      Mitral insufficiency      Neuropathy, diabetic      Non-rheumatic tricuspid valve insufficiency      Obesity      Paroxysmal atrial fibrillation      Peripheral artery disease      Reaven's syndrome      S/P bilateral below knee amputation      Sleep apnea      Urinary retention              Family History   Problem Relation Age of Onset     Other  Other      there is a positive family hx of coronary disease     Social History     Social History     Marital status:      Spouse name: N/A     Number of children: N/A     Years of education: N/A     Social History Main Topics     Smoking status: Former Smoker     Packs/day: 2.00     Years: 3.00     Types: Cigarettes     Quit date: 1/1/1957     Smokeless tobacco: Never Used     Alcohol use No     Drug use: No     Sexual activity: Not on file     Other Topics Concern     Not on file     Social History Narrative         Review of Systems  Currently denies chills fever coughing wheezing chest pain dizziness flulike symptoms rashes sores stiff neck or swollen glands. A history of combined systolic and diastolic congestive heart failure diabetes hypertension A. fib chronic kidney disease hypothyroidism BPH.  . Blood pressure 1 1 3/6 5 pulse 70 respirations 18 temperature 98.4    Physical Exam  Constitutional: No distress.   HENT:   Head: Normocephalic.   Eyes: Pupils are equal, round, and reactive to light.   Neck: Neck supple. No thyromegaly present.   Cardiovascular:   Irregularity. Mitral valve.   Pulmonary/Chest: Breath sounds normal.   Abdominal: Bowel sounds are normal. There is no tenderness. There is no guarding.   Genitourinary:   Genitourinary Comments: BPH. lino  Musculoskeletal:   Bilateral below-knee amputations with prosthetics. Generalized weakness.   Lymphadenopathy:   He has no cervical adenopathy.   Neurological: He is alert.   Skin: Skin is warm and dry. No rash noted.             LABS:   Lab Results   Component Value Date    WBC 9.5 06/16/2016    HGB 10.8 (L) 04/25/2017    HCT 34.9 (L) 06/16/2016    MCV 99 06/16/2016     06/16/2016     Results for orders placed or performed in visit on 05/18/17   Basic Metabolic Panel   Result Value Ref Range    Sodium 137 136 - 145 mmol/L    Potassium 4.2 3.5 - 5.0 mmol/L    Chloride 96 (L) 98 - 107 mmol/L    CO2 31 22 - 31 mmol/L    Anion Gap,  Calculation 10 5 - 18 mmol/L    Glucose 169 (H) 70 - 125 mg/dL    Calcium 8.8 8.5 - 10.5 mg/dL    BUN 50 (H) 8 - 28 mg/dL    Creatinine 1.22 0.70 - 1.30 mg/dL    GFR MDRD Af Amer >60 >60 mL/min/1.73m2    GFR MDRD Non Af Amer 57 (L) >60 mL/min/1.73m2           ASSESSMENT:      ICD-10-CM    1. Diabetes mellitus E11.9    2. CHF (congestive heart failure) I50.9    3. A-fib I48.91    4. Essential hypertension I10        PLAN:    Continue with 3 mg of Coumadin.  Recheck pro time on June 2.  Continue with blood sugar checks and monitoring although he prefers to be on sliding scale.  Not sure yet there is been a discharge date yet set but he does feel pretty close to being able to go home and care for himself.      Electronically signed by: Michael Duane Johnson, CNP

## 2021-06-10 NOTE — PROGRESS NOTES
[facility]    Facility:   [facility] [[id number]]   Code Status: FULL CODE      CHIEF COMPLAINT/REASON FOR VISIT:  Chief Complaint   Patient presents with     Follow Up     chf       HISTORY:      HPI: Maxi is a 81 y.o. male who I had the pleasure of visiting with once again today secondary to his hospitalization March 22 - April 10, 2017 secondary to acute on chronic combined systolic and diastolic congestive heart failure in the setting of coronary artery disease and severe mitral regurgitation.  His ejection fraction 25%.  Also a history of diabetes A1c was 6.4.  History of hypertension along with A. fib.  CKD stage III also a history of bilateral below-knee amputations with prosthetic.  Hypothyroidism the Synthroid was increased 50  g.  History of BPH currently on Proscar and Flomax.  No Bullock catheter.  He was hospitalized secondary to his exacerbation of combined systolic and diastolic heart failure.  Had an angiogram with percutaneous coronary intervention and stents placed on March 27 complicated by postprocedural acute renal failure creatinine peaked at 3.18.  Nephrology was called they did give aggressive diuresis.  A family care conference was scheduled for palliative care.  Currently is in the transitional care unit getting some care.  I have met him at other junctures in the transitional care unit in the past.  He currently is not requiring any oxygen so we will place at this weaning.  His blood sugars ranging .  Has been normotensive and afebrile.  Currently Coumadin 2 mg recheck again next week.  Denies pain.  Appetite fair.    Past Medical History:   Diagnosis Date     Abnormality of gait      Acute on chronic combined systolic (congestive) and diastolic (congestive) heart failure      Biventricular ICD (implantable cardioverter-defibrillator) in place      CAD (coronary artery disease)      CKD (chronic kidney disease), stage III      Closed  nondisplaced fracture of left ischium      Diabetes mellitus      Epigastric pain      Gallstone pancreatitis      GI bleed      Gouty arthropathy      Hyperlipidemia      Hypertension      Hyponatremia      Hypothyroidism      Insomnia      Ischemic dilated cardiomyopathy      Mitral insufficiency      Neuropathy, diabetic      Non-rheumatic tricuspid valve insufficiency      Obesity      Paroxysmal atrial fibrillation      Peripheral artery disease      Reaven's syndrome      S/P bilateral below knee amputation      Sleep apnea      Urinary retention              Family History   Problem Relation Age of Onset     Other Other      there is a positive family hx of coronary disease     Social History     Social History     Marital status:      Spouse name: N/A     Number of children: N/A     Years of education: N/A     Social History Main Topics     Smoking status: Former Smoker     Packs/day: 2.00     Years: 3.00     Types: Cigarettes     Quit date: 1/1/1957     Smokeless tobacco: Never Used     Alcohol use No     Drug use: No     Sexual activity: Not on file     Other Topics Concern     Not on file     Social History Narrative         Review of Systems  Currently denies chills fever coughing wheezing chest pain dizziness flulike symptoms rashes sores stiff neck or swollen glands.  A history of combined systolic and diastolic congestive heart failure diabetes hypertension A. fib chronic kidney disease hypothyroidism BPH.      Current Outpatient Prescriptions:      acetaminophen (TYLENOL) 650 MG CR tablet, Take 650 mg by mouth every 4 (four) hours as needed., Disp: , Rfl:      allopurinol (ZYLOPRIM) 300 MG tablet, Take 300 mg by mouth daily., Disp: , Rfl:      aspirin 81 mg chewable tablet, Chew 81 mg daily., Disp: , Rfl:      atorvastatin (LIPITOR) 20 MG tablet, Take 20 mg by mouth daily with supper., Disp: , Rfl:      bumetanide (BUMEX) 2 MG tablet, Take 4 mg by mouth 2 (two) times a day., Disp: , Rfl:       carvedilol (COREG) 25 MG tablet, Take 12.5 mg by mouth 2 (two) times a day with meals., Disp: , Rfl:      clopidogrel (PLAVIX) 75 mg tablet, Take 75 mg by mouth daily., Disp: , Rfl:      finasteride (PROSCAR) 5 mg tablet, Take 5 mg by mouth daily., Disp: , Rfl:      hydrALAZINE (APRESOLINE) 25 MG tablet, Take 12.5 mg by mouth 2 (two) times a day., Disp: , Rfl:      HYDROcodone-acetaminophen 5-325 mg per tablet, Take 2 tablets by mouth every 8 (eight) hours as needed., Disp: , Rfl:      insulin NPH human recomb (NOVOLIN N INNOLET/HUMULIN N KWIKPEN) 100 unit/mL (3 mL) pen, Inject 18 Units under the skin daily before supper., Disp: , Rfl:      insulin NPH human recomb (NOVOLIN N INNOLET/HUMULIN N KWIKPEN) 100 unit/mL (3 mL) pen, Inject 24 Units under the skin Daily before breakfast., Disp: , Rfl:      insulin regular (NOVOLIN R) 100 unit/mL injection, Inject 10 Units under the skin 2 (two) times a day before meals., Disp: , Rfl:      isosorbide mononitrate (IMDUR) 60 MG 24 hr tablet, Take 60 mg by mouth daily., Disp: , Rfl:      levothyroxine (SYNTHROID, LEVOTHROID) 25 MCG tablet, Take 50 mcg by mouth daily., Disp: , Rfl:      magnesium oxide (MAG-OX) 400 mg tablet, Take 400 mg by mouth daily., Disp: , Rfl:      metOLazone (ZAROXOLYN) 5 MG tablet, Take 5 mg by mouth daily., Disp: , Rfl:      miconazole (MICOTIN) 2 % powder, Apply 1 application topically 2 (two) times a day., Disp: , Rfl:      nitroglycerin (NITROSTAT) 0.4 MG SL tablet, Place 0.4 mg under the tongue every 5 (five) minutes as needed., Disp: , Rfl:      nystatin, bulk, 10 billion unit Powd, Apply 1 application topically 2 (two) times a day as needed. Apply to  bilateral groin until rash resolved, Disp: , Rfl:      peg 400-propylene glycol (SYSTANE) 0.4-0.3 % Drop, Administer 1 drop to both eyes every hour as needed., Disp: , Rfl:      potassium chloride SA (K-DUR,KLOR-CON) 20 MEQ tablet, Take 20 mEq by mouth daily. Every Mon, Tue, Wed, Thu, Fri, Disp: ,  Rfl:      tamsulosin (FLOMAX) 0.4 mg Cp24, Take 0.4 mg by mouth 2 (two) times a day., Disp: , Rfl:      traZODone (DESYREL) 50 MG tablet, Take 100 mg by mouth daily., Disp: , Rfl:      WARFARIN SODIUM (WARFARIN ORAL), Take by mouth. 5mg on Sun, Wed, Sat; 2.5 mg all other days, Disp: , Rfl:     .  Vitals:    04/14/17 1434   BP: 115/60   Pulse: 63   Resp: 20   Temp: (!) 96.1  F (35.6  C)       Physical Exam   Constitutional: No distress.   HENT:   Head: Normocephalic.   Eyes: Pupils are equal, round, and reactive to light.   Neck: Neck supple. No thyromegaly present.   Cardiovascular:   Irregularity.  Mitral valve.   Pulmonary/Chest: Breath sounds normal.   Abdominal: Bowel sounds are normal. There is no tenderness. There is no guarding.   Genitourinary:   Genitourinary Comments: BPH.   Musculoskeletal:   Bilateral below-knee amputations with prosthetics.  Generalized weakness.   Lymphadenopathy:     He has no cervical adenopathy.   Neurological: He is alert.   Skin: Skin is warm and dry. No rash noted.         LABS:   Results for orders placed or performed in visit on 04/12/17   Basic Metabolic Panel   Result Value Ref Range    Sodium 136 136 - 145 mmol/L    Potassium 3.0 (L) 3.5 - 5.0 mmol/L    Chloride 83 (L) 98 - 107 mmol/L    CO2 41 (HH) 22 - 31 mmol/L    Anion Gap, Calculation 12 5 - 18 mmol/L    Glucose 244 (H) 70 - 125 mg/dL    Calcium 9.2 8.5 - 10.5 mg/dL     (H) 8 - 28 mg/dL    Creatinine 2.04 (H) 0.70 - 1.30 mg/dL    GFR MDRD Af Amer 38 (L) >60 mL/min/1.73m2    GFR MDRD Non Af Amer 31 (L) >60 mL/min/1.73m2       Lab Results   Component Value Date    WBC 9.5 06/16/2016    HGB 10.6 (L) 06/16/2016    HCT 34.9 (L) 06/16/2016    MCV 99 06/16/2016     06/16/2016         ASSESSMENT:      ICD-10-CM    1. CHF (congestive heart failure) I50.9    2. Essential hypertension I10    3. Diabetes mellitus E11.9    4. A-fib I48.91    5. S/P bilateral BKA (below knee amputation) Z89.512     Z89.511    6.  Hypothyroidism E03.9    7. BPH (benign prostatic hyperplasia) N40.0        PLAN:    We will give him a little extra potassium at this time also recheck the BMP on Monday.  Coumadin check next week.  Continue to monitor the blood sugars on the blood pressures.      Electronically signed by: Michael Duane Johnson, CNP

## 2021-06-10 NOTE — PROGRESS NOTES
[facility]    Facility:   [facility] [[id number]]   Code Status: FULL CODE      CHIEF COMPLAINT/REASON FOR VISIT:  Chief Complaint   Patient presents with     Follow Up     rehab       HISTORY:      HPI: Maxi is a 81 y.o. male who I had a chance to visit with once again today secondary to his most recent hospitalization May 11 - May 15, 2017 secondary to gross hematuria.  He was on the transitional care unit secondary to his combined systolic and diastolic heart failure secondary to ischemic dilated cardiomyopathy status post biventricular ICD with severe tricuspid and mitral regurgitation.  Urology was consulted CT of the abdomen was obtained unremarkable urinary system.  Warfarin was stopped then restarted.  He failed the voiding trial and therefore he does have a Bullock catheter.  He continues with his Flomax and finasteride.  They thought that possibly he did have a UTI gave him some antibiotics.  He does have diabetes current blood sugars ranging 1 .  He has been normotensive and afebrile.  He did have a BMP done on the 18th see results below.  He does not seem to have any pain.  He is participating in therapy.  He does have bilateral below-knee amputations.    Past Medical History:   Diagnosis Date     Abnormality of gait      Acute on chronic combined systolic (congestive) and diastolic (congestive) heart failure      Biventricular ICD (implantable cardioverter-defibrillator) in place      BPH (benign prostatic hyperplasia)      CAD (coronary artery disease)      CKD (chronic kidney disease), stage III      Closed nondisplaced fracture of left ischium      Diabetes mellitus      Epigastric pain      Gallstone pancreatitis      GI bleed      Gouty arthropathy      Gross hematuria      Hyperlipidemia      Hypertension      Hyponatremia      Hypothyroidism      Insomnia      Ischemic dilated cardiomyopathy      Mitral insufficiency      Neuropathy, diabetic       Non-rheumatic tricuspid valve insufficiency      Obesity      Paroxysmal atrial fibrillation      Peripheral artery disease      Reaven's syndrome      S/P bilateral below knee amputation      Sleep apnea      Urinary retention              Family History   Problem Relation Age of Onset     Other Other      there is a positive family hx of coronary disease     Social History     Social History     Marital status:      Spouse name: N/A     Number of children: N/A     Years of education: N/A     Social History Main Topics     Smoking status: Former Smoker     Packs/day: 2.00     Years: 3.00     Types: Cigarettes     Quit date: 1/1/1957     Smokeless tobacco: Never Used     Alcohol use No     Drug use: No     Sexual activity: Not on file     Other Topics Concern     Not on file     Social History Narrative         Review of Systems  Currently denies chills fever coughing wheezing chest pain dizziness flulike symptoms rashes sores stiff neck or swollen glands. A history of combined systolic and diastolic congestive heart failure diabetes hypertension A. fib chronic kidney disease hypothyroidism BPH.      Current Outpatient Prescriptions:      acetaminophen (TYLENOL) 650 MG CR tablet, Take 650 mg by mouth every 8 (eight) hours as needed for pain. , Disp: , Rfl:      allopurinol (ZYLOPRIM) 300 MG tablet, Take 100 mg by mouth daily. , Disp: , Rfl:      atorvastatin (LIPITOR) 20 MG tablet, Take 20 mg by mouth daily with supper., Disp: , Rfl:      bumetanide (BUMEX) 2 MG tablet, Take 4 mg by mouth 2 (two) times a day., Disp: , Rfl:      carvedilol (COREG) 25 MG tablet, Take 12.5 mg by mouth 2 (two) times a day with meals., Disp: , Rfl:      cefdinir (OMNICEF) 300 MG capsule, Take 300 mg by mouth 2 (two) times a day., Disp: , Rfl:      clopidogrel (PLAVIX) 75 mg tablet, Take 75 mg by mouth daily., Disp: , Rfl:      finasteride (PROSCAR) 5 mg tablet, Take 5 mg by mouth daily., Disp: , Rfl:      insulin aspart (NOVOLOG)  100 unit/mL injection pen, 1u per 15g of carbohydrates plus sliding scale., Disp: , Rfl:      insulin aspart (NOVOLOG) 100 unit/mL injection pen, Inject under the skin 3 (three) times a day with meals. Per sliding scale: 70 - 149: No corrective insulin, 150 - 199:2 units, 200 - 249:4 units, 250 - 299: 6 units, 300 - 349: 8 units, 350 or greater:10 units and notify MD, Disp: , Rfl:      isosorbide mononitrate (IMDUR) 60 MG 24 hr tablet, Take 60 mg by mouth daily., Disp: , Rfl:      levothyroxine (SYNTHROID, LEVOTHROID) 25 MCG tablet, Take 50 mcg by mouth Daily before breakfast. , Disp: , Rfl:      magnesium oxide (MAG-OX) 400 mg tablet, Take 400 mg by mouth daily., Disp: , Rfl:      miconazole (MICOTIN) 2 % powder, Apply 1 application topically 2 (two) times a day., Disp: , Rfl:      nitroglycerin (NITROSTAT) 0.4 MG SL tablet, Place 0.4 mg under the tongue every 5 (five) minutes as needed., Disp: , Rfl:      nystatin, bulk, 10 billion unit Powd, Apply 1 application topically 2 (two) times a day as needed. Apply to  bilateral groin until rash resolved, Disp: , Rfl:      peg 400-propylene glycol (SYSTANE) 0.4-0.3 % Drop, Administer 1 drop to both eyes every hour as needed., Disp: , Rfl:      potassium chloride SA (K-DUR,KLOR-CON) 20 MEQ tablet, Take 30 mEq by mouth 2 (two) times a day with meals. , Disp: , Rfl:      senna (SENOKOT) 8.6 mg tablet, Take 1 tablet by mouth 2 (two) times a day., Disp: , Rfl:      SOD PHOS,M-B/NA PHOS,DI-BA (FLEET ENEMA RECT), Insert 1 enema into the rectum daily as needed., Disp: , Rfl:      tamsulosin (FLOMAX) 0.4 mg Cp24, Take 0.4 mg by mouth 2 (two) times a day., Disp: , Rfl:      traZODone (DESYREL) 50 MG tablet, Take 100 mg by mouth at bedtime. , Disp: , Rfl:      WARFARIN SODIUM (WARFARIN ORAL), Take by mouth. 2.5mg daily  5mg on Sun, Wed, Sat; 2.5 mg all other days, Disp: , Rfl:     .  Vitals:    05/19/17 1459   BP: 135/71   Pulse: 90   Resp: 18   Temp: 96.9  F (36.1  C)        Physical Exam  Constitutional: No distress.   HENT:   Head: Normocephalic.   Eyes: Pupils are equal, round, and reactive to light.   Neck: Neck supple. No thyromegaly present.   Cardiovascular:   Irregularity. Mitral valve.   Pulmonary/Chest: Breath sounds normal.   Abdominal: Bowel sounds are normal. There is no tenderness. There is no guarding.   Genitourinary:   Genitourinary Comments: BPH. lino  Musculoskeletal:   Bilateral below-knee amputations with prosthetics. Generalized weakness.   Lymphadenopathy:   He has no cervical adenopathy.   Neurological: He is alert.   Skin: Skin is warm and dry. No rash noted.         LABS:   Results for orders placed or performed in visit on 05/18/17   Basic Metabolic Panel   Result Value Ref Range    Sodium 137 136 - 145 mmol/L    Potassium 4.2 3.5 - 5.0 mmol/L    Chloride 96 (L) 98 - 107 mmol/L    CO2 31 22 - 31 mmol/L    Anion Gap, Calculation 10 5 - 18 mmol/L    Glucose 169 (H) 70 - 125 mg/dL    Calcium 8.8 8.5 - 10.5 mg/dL    BUN 50 (H) 8 - 28 mg/dL    Creatinine 1.22 0.70 - 1.30 mg/dL    GFR MDRD Af Amer >60 >60 mL/min/1.73m2    GFR MDRD Non Af Amer 57 (L) >60 mL/min/1.73m2       Lab Results   Component Value Date    WBC 9.5 06/16/2016    HGB 10.8 (L) 04/25/2017    HCT 34.9 (L) 06/16/2016    MCV 99 06/16/2016     06/16/2016     No results found for: HGBA1C      ASSESSMENT:      ICD-10-CM    1. CKD (chronic kidney disease) N18.9    2. CHF (congestive heart failure) I50.9    3. Physical deconditioning R53.81    4. Diabetes mellitus E11.9        PLAN:    No new changes at this time.  Continue to work with therapy.  He did not have any other questions.  They are putting some cream on his rectal fissure which apparently is slowly resolving.        Electronically signed by: Michael Duane Johnson, CNP

## 2021-06-10 NOTE — PROGRESS NOTES
[facility]    Facility:   [facility] [[id number]]   Code Status: UNKNOWN      CHIEF COMPLAINT/REASON FOR VISIT:  Chief Complaint   Patient presents with     Review Of Multiple Medical Conditions     Combined diastolic and systolic congestive heart failure, diabetes, hypertension, paroxysmal atrial fibrillation, acute on chronic kidney disease stage III, benign prostatic hypertrophy, status post bilateral below the knee amputations.       HISTORY:      HPI: Maxi is a 81 y.o. male who resides at the [facility] after hospitalization on March 22, 2017 secondary to acute on chronic combined systolic congestive heart failure.  He does have severe mitral regurgitation as well as tricuspid regurgitation and he does have a history of ischemic dilated cardiomyopathy.  He does have type 2 diabetes and chronic kidney disease with peripheral artery disease and bilateral below the knee amputations.  He was sent in originally by his cardiologist.  His last hemoglobin A1c was 6.4 and he was diuresed in the hospital.  He was hemodynamically stable.  During his time here in the TCU I noticed his BUN has remained relatively high however his creatinine has improved and his GFR has improved.  This is in the text of weight loss of about 20 pounds likely with diuresis.  His hemoglobin is gone from 10.1-9.8.  And appears to be stable.  His sodium was low at 2.8 and is 3.0 on 420.  I am also working with his adjust his Coumadin and his INR is 2.8.  Patient does not feel any different today than he has he is an incredibly complex ill man at this point with worsening BUN with astronomically high numbers he is getting IV fluids multiple times and he still remains on he is on Bumex 2 mg tablets 2 tablets by mouth 2 times a day.  I may cut the afternoon dose to just 2 mg instead of 4 mg secondary to his elevated BUN.  I do believe when he was hospitalized he was in the hospital his  BUN was 121 with a creatinine of 2.29 and is gradually come down to the 90s.  However now it is 114 but his creatinine is improving.  GFR was 26 now it has improved to 39.  I do not believe I want to give him IV fluids at this time his weight is down to 220 however he is telling me that they awaken him sometimes with his legs on and sometimes without his artificial legs on.  I am unclear then where his exact weight is at this time however by my exam today he seems euvolemic and his kidney function tests are around baseline.    I will he is on potassium 20 mEq 3 times a day and his potassium back on 420 was 3.0 but up from 2.8.  Patient has no other concerns today.    Past Medical History:   Diagnosis Date     Abnormality of gait      Acute on chronic combined systolic (congestive) and diastolic (congestive) heart failure      Biventricular ICD (implantable cardioverter-defibrillator) in place      CAD (coronary artery disease)      CKD (chronic kidney disease), stage III      Closed nondisplaced fracture of left ischium      Diabetes mellitus      Epigastric pain      Gallstone pancreatitis      GI bleed      Gouty arthropathy      Hyperlipidemia      Hypertension      Hyponatremia      Hypothyroidism      Insomnia      Ischemic dilated cardiomyopathy      Mitral insufficiency      Neuropathy, diabetic      Non-rheumatic tricuspid valve insufficiency      Obesity      Paroxysmal atrial fibrillation      Peripheral artery disease      Reaven's syndrome      S/P bilateral below knee amputation      Sleep apnea      Urinary retention              Family History   Problem Relation Age of Onset     Other Other      there is a positive family hx of coronary disease     Social History     Social History     Marital status:      Spouse name: N/A     Number of children: N/A     Years of education: N/A     Social History Main Topics     Smoking status: Former Smoker     Packs/day: 2.00     Years: 3.00     Types:  Cigarettes     Quit date: 1/1/1957     Smokeless tobacco: Never Used     Alcohol use No     Drug use: No     Sexual activity: Not Asked     Other Topics Concern     None     Social History Narrative         Review of Systems   Constitutional:        Patient is somewhat frustrated because he does not remember the nurses name and he is trying to get into bed.  However he says he is doing fine at this time his pain is well managed and he denies any fevers chills nausea vomiting or diarrhea.  He denies any chest pain or shortness of breath and the remainder the review of systems is basically negative.       .  Vitals:    04/28/17 1232   BP: 116/74   Pulse: 80   Resp: 20   Temp: 97.3  F (36.3  C)   SpO2: 91%       Physical Exam   Constitutional: He is oriented to person, place, and time. No distress.   HENT:   Head: Normocephalic and atraumatic.   Nose: Nose normal.   Eyes: Conjunctivae and EOM are normal. Pupils are equal, round, and reactive to light. Right eye exhibits no discharge. No scleral icterus.   Neck: No tracheal deviation present. No thyromegaly present.   Cardiovascular: Exam reveals no gallop and no friction rub.    Murmur heard.  Patient's heart sounds are irregularly irregular with adequate rate control.   Pulmonary/Chest: No respiratory distress. He has no wheezes. He has no rales. He exhibits no tenderness.   Abdominal: He exhibits no mass. There is no tenderness. There is no rebound and no guarding.   Musculoskeletal: Normal range of motion.   Patient is bilateral lower extremity amputee.   Lymphadenopathy:     He has no cervical adenopathy.   Neurological: He is alert and oriented to person, place, and time. He has normal reflexes. No cranial nerve deficit.   Skin: Skin is warm and dry.   Psychiatric: He has a normal mood and affect. His behavior is normal.         LABS: Laboratory values from 4/17/2017 showed a creatinine 1.99 and a creatinine on 4/20/2017 was 1.68 with a GFR going from .   Patient's BUN was 113 on 4/20/2017 and was 114 on 4/17/2017.  Sodium was 136 back on the 17th and potassium was 2.8.  Potassium is going to 3.0 with a sodium 135.  Patient's weights are all over the place and it is unclear if he had a weight loss or gain.  His last weight on 426 was 220 pounds and apparently he did come in at about 249.      ASSESSMENT:      ICD-10-CM    1. CKD (chronic kidney disease) N18.9    2. CHF (congestive heart failure) I50.9    3. S/P bilateral BKA (below knee amputation) Z89.512     Z89.511    4. Hypokalemia E87.6    5. A-fib I48.91    6. Anticoagulation management encounter Z51.81     Z79.01        PLAN: Plan at this time is to continue to monitor weights accurately and I will decrease his afternoon Bumex to 2 mg instead of 4 mg.  I will increase his potassium to 30 mEq 3 times daily with a potassium at 3.0 and a basic metabolic profile will be done on Monday secondary to decreased potassium and increased BUN.  He is he is drinking fluids adequately at this time and I did have the discussion with him he does want to be DO NOT RESUSCITATE and possibly do not hospitalize but he is going to think about that more.  Given his multiple comorbidities he may be a candidate for comfort care and hospice at this time.  I will continue him on 2.5 mg of Coumadin daily and I will check his INR on Monday.  He has been on 2.5 mg since the 25th of Coumadin and I will he is 2.8.  He is stayed the same since the 25th to the 28th so I will continue this dose.  No other changes to care plan at this time and greater than 40 minutes was spent on this follow-up critical care visit with greater than 50% of the time dedicated to coordination of care.      Total  minutes of which % was spent counseling and coordination of care of the above plan.    Electronically signed by: Mario West DO

## 2021-06-10 NOTE — PROGRESS NOTES
[facility]    Facility:   [facility] [[id number]]   Code Status: FULL CODE      CHIEF COMPLAINT/REASON FOR VISIT:  Chief Complaint   Patient presents with     Follow Up     IV, labs       HISTORY:      HPI: Maxi is a 81 y.o. male who I had a chance to revisit with today secondary and initially to his hospitalization March 22 - April 10 secondary to acute on chronic combined systolic and diastolic congestive heart failure in the setting of CAD and mitral regurgitation.  Before last weekend he was having some dehydration and he agreed that he should have IV fluids to the weekend which we did do and there was supposed to be a BMP ordered for yesterday and franc and behold nobody knows why it was not ordered or administered or taken yesterday because everybody says they do not know so they are going to try to get the lab today even though it still on the books that it was supposed to be done yesterday but nobody has a clue.  We also been looking at his potassium and trying to readjust that so hopefully that is pretty good shape.  Regarding the fluids he did feel it was helpful he did feel better.  He has been normotensive currently is also on hydralazine 12.5 mg twice daily we will discontinue that most of his systolic blood pressures have been less than 120.  Bullock catheter.  Also is on Coumadin recheck again on the 28th.  Blood sugars in the morning ranging 90-1 60 p.m. 82-1 70 there was a high over the weekend of 486 I am not sure why with everything else is been pretty much less than 180.  He was supposed to go to gastroenterology today for recheck and evaluation of his melena that only a couple of staff is seen and I have not seen at.  He supposedly is still working out with therapy.    Past Medical History:   Diagnosis Date     Abnormality of gait      Acute on chronic combined systolic (congestive) and diastolic (congestive) heart failure      Biventricular ICD (implantable  cardioverter-defibrillator) in place      CAD (coronary artery disease)      CKD (chronic kidney disease), stage III      Closed nondisplaced fracture of left ischium      Diabetes mellitus      Epigastric pain      Gallstone pancreatitis      GI bleed      Gouty arthropathy      Hyperlipidemia      Hypertension      Hyponatremia      Hypothyroidism      Insomnia      Ischemic dilated cardiomyopathy      Mitral insufficiency      Neuropathy, diabetic      Non-rheumatic tricuspid valve insufficiency      Obesity      Paroxysmal atrial fibrillation      Peripheral artery disease      Reaven's syndrome      S/P bilateral below knee amputation      Sleep apnea      Urinary retention              Family History   Problem Relation Age of Onset     Other Other      there is a positive family hx of coronary disease     Social History     Social History     Marital status:      Spouse name: N/A     Number of children: N/A     Years of education: N/A     Social History Main Topics     Smoking status: Former Smoker     Packs/day: 2.00     Years: 3.00     Types: Cigarettes     Quit date: 1/1/1957     Smokeless tobacco: Never Used     Alcohol use No     Drug use: No     Sexual activity: Not on file     Other Topics Concern     Not on file     Social History Narrative         Review of Systems  Currently denies chills fever coughing wheezing chest pain dizziness flulike symptoms rashes sores stiff neck or swollen glands. A history of combined systolic and diastolic congestive heart failure diabetes hypertension A. fib chronic kidney disease hypothyroidism BPH.  Current Outpatient Prescriptions   Medication Sig     acetaminophen (TYLENOL) 650 MG CR tablet Take 650 mg by mouth every 4 (four) hours as needed.     allopurinol (ZYLOPRIM) 300 MG tablet Take 300 mg by mouth daily.     aspirin 81 mg chewable tablet Chew 81 mg daily.     atorvastatin (LIPITOR) 20 MG tablet Take 20 mg by mouth daily with supper.     bumetanide  (BUMEX) 2 MG tablet Take 4 mg by mouth 2 (two) times a day.     carvedilol (COREG) 25 MG tablet Take 12.5 mg by mouth 2 (two) times a day with meals.     clopidogrel (PLAVIX) 75 mg tablet Take 75 mg by mouth daily.     finasteride (PROSCAR) 5 mg tablet Take 5 mg by mouth daily.     HYDROcodone-acetaminophen 5-325 mg per tablet Take 2 tablets by mouth every 8 (eight) hours as needed.     insulin NPH human recomb (NOVOLIN N INNOLET/HUMULIN N KWIKPEN) 100 unit/mL (3 mL) pen Inject 18 Units under the skin daily before supper.     insulin NPH human recomb (NOVOLIN N INNOLET/HUMULIN N KWIKPEN) 100 unit/mL (3 mL) pen Inject 24 Units under the skin Daily before breakfast.     insulin regular (NOVOLIN R) 100 unit/mL injection Inject 10 Units under the skin 2 (two) times a day before meals.     isosorbide mononitrate (IMDUR) 60 MG 24 hr tablet Take 60 mg by mouth daily.     levothyroxine (SYNTHROID, LEVOTHROID) 25 MCG tablet Take 50 mcg by mouth daily.     magnesium oxide (MAG-OX) 400 mg tablet Take 400 mg by mouth daily.     miconazole (MICOTIN) 2 % powder Apply 1 application topically 2 (two) times a day.     nitroglycerin (NITROSTAT) 0.4 MG SL tablet Place 0.4 mg under the tongue every 5 (five) minutes as needed.     nystatin, bulk, 10 billion unit Powd Apply 1 application topically 2 (two) times a day as needed. Apply to  bilateral groin until rash resolved     peg 400-propylene glycol (SYSTANE) 0.4-0.3 % Drop Administer 1 drop to both eyes every hour as needed.     potassium chloride SA (K-DUR,KLOR-CON) 20 MEQ tablet Take 20 mEq by mouth 2 (two) times a day. \     tamsulosin (FLOMAX) 0.4 mg Cp24 Take 0.4 mg by mouth 2 (two) times a day.     traZODone (DESYREL) 50 MG tablet Take 100 mg by mouth daily.     WARFARIN SODIUM (WARFARIN ORAL) Take by mouth. 5mg on Sun, Wed, Sat; 2.5 mg all other days       .  Blood pressure 103/63 pulse 82 respirations 21 temperature 97.2    Physical Exam   Constitutional: No distress.   HENT:    Head: Normocephalic.   Eyes: Pupils are equal, round, and reactive to light.   Neck: Neck supple. No thyromegaly present.   Cardiovascular:   Irregularity. Mitral valve.   Pulmonary/Chest: Breath sounds normal.   Abdominal: Bowel sounds are normal. There is no tenderness. There is no guarding.   Genitourinary:   Genitourinary Comments: BPH. lino  Musculoskeletal:   Bilateral below-knee amputations with prosthetics. Generalized weakness.   Lymphadenopathy:   He has no cervical adenopathy.   Neurological: He is alert.   Skin: Skin is warm and dry. No rash noted.      LABS:   Results for orders placed or performed in visit on 04/20/17   Basic Metabolic Panel   Result Value Ref Range    Sodium 135 (L) 136 - 145 mmol/L    Potassium 3.0 (L) 3.5 - 5.0 mmol/L    Chloride 80 (L) 98 - 107 mmol/L    CO2 45 (HH) 22 - 31 mmol/L    Anion Gap, Calculation 10 5 - 18 mmol/L    Glucose 150 (H) 70 - 125 mg/dL    Calcium 9.4 8.5 - 10.5 mg/dL     (H) 8 - 28 mg/dL    Creatinine 1.68 (H) 0.70 - 1.30 mg/dL    GFR MDRD Af Amer 48 (L) >60 mL/min/1.73m2    GFR MDRD Non Af Amer 39 (L) >60 mL/min/1.73m2       Lab Results   Component Value Date    WBC 9.5 06/16/2016    HGB 10.1 (L) 04/17/2017    HCT 34.9 (L) 06/16/2016    MCV 99 06/16/2016     06/16/2016         ASSESSMENT:      ICD-10-CM    1. Essential hypertension I10    2. Diabetes mellitus E11.9    3. CHF (congestive heart failure) I50.9    4. CKD (chronic kidney disease) N18.9        PLAN:    Once again he was supposed to have a BMP ordered for yesterday and nobody does not know why it was not done so hopefully we will get it today.  He supposed to go to gastroenterology today.  He is also on 2.5 mg of Coumadin recheck again on the 28th.  Discontinue the hydralazine 12.5 mg twice daily and continue to monitor his blood pressures.  Also hoping to find out what his potassium levels is at.      Electronically signed by: Michael Duane Johnson, CNP

## 2021-06-10 NOTE — PROGRESS NOTES
[facility]    Facility:   [facility] [[id number]]   Code Status: FULL CODE      CHIEF COMPLAINT/REASON FOR VISIT:  Chief Complaint   Patient presents with     Follow Up     rehab, [place],        HISTORY:      HPI: Maxi is a 81 y.o. male Who I had the chance to revisit with again today secondary to his multiple chronic medical conditions and most recently hospitalization secondary to hematuria. Trying to find out what his next visit is to see urology. Blood sugars ranging 116 - 230. He has been normotensive and afebrile. No pain issues. He is on Coumadin his next pro time is June 2. Last BMP was May 18.    Past Medical History:   Diagnosis Date     Abnormality of gait      Acute on chronic combined systolic (congestive) and diastolic (congestive) heart failure      Biventricular ICD (implantable cardioverter-defibrillator) in place      BPH (benign prostatic hyperplasia)      CAD (coronary artery disease)      CKD (chronic kidney disease), stage III      Closed nondisplaced fracture of left ischium      Diabetes mellitus      Epigastric pain      Gallstone pancreatitis      GI bleed      Gouty arthropathy      Gross hematuria      Hyperlipidemia      Hypertension      Hyponatremia      Hypothyroidism      Insomnia      Ischemic dilated cardiomyopathy      Mitral insufficiency      Neuropathy, diabetic      Non-rheumatic tricuspid valve insufficiency      Obesity      Paroxysmal atrial fibrillation      Peripheral artery disease      Reaven's syndrome      S/P bilateral below knee amputation      Sleep apnea      Urinary retention              Family History   Problem Relation Age of Onset     Other Other      there is a positive family hx of coronary disease     Social History     Social History     Marital status:      Spouse name: N/A     Number of children: N/A     Years of education: N/A     Social History Main Topics     Smoking status: Former Smoker     Packs/day:  2.00     Years: 3.00     Types: Cigarettes     Quit date: 1/1/1957     Smokeless tobacco: Never Used     Alcohol use No     Drug use: No     Sexual activity: Not on file     Other Topics Concern     Not on file     Social History Narrative         Review of Systems  Currently denies chills fever coughing wheezing chest pain dizziness flulike symptoms rashes sores stiff neck or swollen glands. A history of combined systolic and diastolic congestive heart failure diabetes hypertension A. fib chronic kidney disease hypothyroidism BPH.    /  Current Outpatient Prescriptions:      acetaminophen (TYLENOL) 650 MG CR tablet, Take 650 mg by mouth every 8 (eight) hours as needed for pain. , Disp: , Rfl:      allopurinol (ZYLOPRIM) 300 MG tablet, Take 100 mg by mouth daily. , Disp: , Rfl:      atorvastatin (LIPITOR) 20 MG tablet, Take 20 mg by mouth daily with supper., Disp: , Rfl:      bumetanide (BUMEX) 2 MG tablet, Take 4 mg by mouth 2 (two) times a day., Disp: , Rfl:      carvedilol (COREG) 25 MG tablet, Take 12.5 mg by mouth 2 (two) times a day with meals., Disp: , Rfl:      clopidogrel (PLAVIX) 75 mg tablet, Take 75 mg by mouth daily., Disp: , Rfl:      finasteride (PROSCAR) 5 mg tablet, Take 5 mg by mouth daily., Disp: , Rfl:      insulin aspart (NOVOLOG) 100 unit/mL injection pen, 1u per 15g of carbohydrates plus sliding scale., Disp: , Rfl:      insulin aspart (NOVOLOG) 100 unit/mL injection pen, Inject under the skin 3 (three) times a day with meals. Per sliding scale: 70 - 149: No corrective insulin, 150 - 199:2 units, 200 - 249:4 units, 250 - 299: 6 units, 300 - 349: 8 units, 350 or greater:10 units and notify MD, Disp: , Rfl:      isosorbide mononitrate (IMDUR) 60 MG 24 hr tablet, Take 60 mg by mouth daily., Disp: , Rfl:      levothyroxine (SYNTHROID, LEVOTHROID) 25 MCG tablet, Take 50 mcg by mouth Daily before breakfast. , Disp: , Rfl:      magnesium oxide (MAG-OX) 400 mg tablet, Take 400 mg by mouth daily.,  Disp: , Rfl:      miconazole (MICOTIN) 2 % powder, Apply 1 application topically 2 (two) times a day., Disp: , Rfl:      nitroglycerin (NITROSTAT) 0.4 MG SL tablet, Place 0.4 mg under the tongue every 5 (five) minutes as needed., Disp: , Rfl:      nystatin, bulk, 10 billion unit Powd, Apply 1 application topically 2 (two) times a day as needed. Apply to  bilateral groin until rash resolved, Disp: , Rfl:      peg 400-propylene glycol (SYSTANE) 0.4-0.3 % Drop, Administer 1 drop to both eyes every hour as needed., Disp: , Rfl:      potassium chloride SA (K-DUR,KLOR-CON) 20 MEQ tablet, Take 30 mEq by mouth 2 (two) times a day with meals. , Disp: , Rfl:      senna (SENOKOT) 8.6 mg tablet, Take 1 tablet by mouth 2 (two) times a day., Disp: , Rfl:      SOD PHOS,M-B/NA PHOS,DI-BA (FLEET ENEMA RECT), Insert 1 enema into the rectum daily as needed., Disp: , Rfl:      tamsulosin (FLOMAX) 0.4 mg Cp24, Take 0.4 mg by mouth 2 (two) times a day., Disp: , Rfl:      traZODone (DESYREL) 50 MG tablet, Take 100 mg by mouth at bedtime. , Disp: , Rfl:      WARFARIN SODIUM (WARFARIN ORAL), Take by mouth. 2.5mg daily  5mg on Sun, Wed, Sat; 2.5 mg all other days, Disp: , Rfl:     .  Vitals:    05/30/17 1433   BP: 108/69   Pulse: 69   Resp: 20   Temp: 98.3  F (36.8  C)       Physical Exam  Constitutional: No distress.   HENT:   Head: Normocephalic.   Eyes: Pupils are equal, round, and reactive to light.   Neck: Neck supple. No thyromegaly present.   Cardiovascular:   Irregularity. Mitral valve.   Pulmonary/Chest: Breath sounds normal.   Abdominal: Bowel sounds are normal. There is no tenderness. There is no guarding.   Genitourinary:   Genitourinary Comments: BPH. lino  Musculoskeletal:   Bilateral below-knee amputations with prosthetics. Generalized weakness.   Lymphadenopathy:   He has no cervical adenopathy.   Neurological: He is alert.   Skin: Skin is warm and dry. No rash noted.               LABS:   Lab Results   Component Value Date     WBC 9.5 06/16/2016    HGB 10.8 (L) 04/25/2017    HCT 34.9 (L) 06/16/2016    MCV 99 06/16/2016     06/16/2016     Results for orders placed or performed in visit on 05/18/17   Basic Metabolic Panel   Result Value Ref Range    Sodium 137 136 - 145 mmol/L    Potassium 4.2 3.5 - 5.0 mmol/L    Chloride 96 (L) 98 - 107 mmol/L    CO2 31 22 - 31 mmol/L    Anion Gap, Calculation 10 5 - 18 mmol/L    Glucose 169 (H) 70 - 125 mg/dL    Calcium 8.8 8.5 - 10.5 mg/dL    BUN 50 (H) 8 - 28 mg/dL    Creatinine 1.22 0.70 - 1.30 mg/dL    GFR MDRD Af Amer >60 >60 mL/min/1.73m2    GFR MDRD Non Af Amer 57 (L) >60 mL/min/1.73m2           ASSESSMENT:      ICD-10-CM    1. CHF (congestive heart failure) I50.9    2. BPH (benign prostatic hyperplasia) N40.0    3. Physical deconditioning R53.81    4. Diabetes mellitus E11.9        PLAN:    At this time no further workup. Exit doing quite well from a therapy standpoint and still trying to find out when his next visit is to see urology. He wants to get that Bullock discontinued        Electronically signed by: Michael Duane Johnson, CNP

## 2021-06-10 NOTE — PROGRESS NOTES
[facility]      Facility:    [facility] [[id number]]  Code Status: UNKNOWN      Chief Complaint/Reason for Visit:  Chief Complaint   Patient presents with     H & P     No readmitted for rehospitalization for gross hematuria which did resolve in the hospital, type 2 diabetes, paroxysmal atrial fibrillation, chronic kidney disease stage III, combined systolic and diastolic heart failure, status post bilateral below the knee amputation, coronary artery disease, biventricular ICD placement, hypothyroidism, benign prostatic hyperplasia.       HPI:   Maxi is a 81 y.o. male who was residing here at the [facility] secondary to weakness.  He does have a history of atrial fibrillation and is currently anticoagulated with Coumadin.  He was progressing well according to my nurse practitioner however he did go to the hospital on 5/11/2017 secondary to gross hematuria.  It was unclear actually what his hemoglobin was at this time but he was brought to the hospital.  His INR was 3 and he was started on irrigation and anticoagulation was held.  CT scan of the abdomen did not reveal any acute findings from a urinary tract perspective and irrigation was stopped and his urination did clear.  His Coumadin was restarted on 5/13/2017 and he did have a voiding trial on 514 but the Bullock needed to be reinserted.  He continued to have urinary retention and abdominal pain and he will follow-up with urology in about 3 weeks for a voiding trial.  He did continue Flomax and finasteride for urinary retention in his urine culture did grow out E. coli.  Urinary tract infection may have been contributing to this and he did complete a total 7 day course of antibiotics.  He was sent out on Ceftin near and urine cultures is still in process.  Patient had pleural calcifications/possible nodule noted on CT scan of the abdomen and radiology recommended a lung CT with IV contrast this was not done  secondary to significant renal impairment months ago from contrast dye.  And we will continue to monitor.  Patient was recommended to be on both Plavix and Coumadin and follow-up with cardiac cardiology as recommended.  He did have type 2 diabetes and he was on NPH insulin prior to admission 24 units before breakfast and 18 units before dinner.  Patient's blood sugars here are running anywhere from 97 up to 184.  Since he was admitted yesterday he was 149, 146, 184, and 147.  He was treated appropriately and transferred here to the TCU at [facility] in stable condition.    Patient claims today that he is not in any pain and he feels very comfortable.  He does occasionally get pains in his legs and he does have some loose stools.  His urine in the catheter bag is clear at this time and he denies any other issues besides the loose stools.  Staff have no other concerns.    Past Medical History:  Past Medical History:   Diagnosis Date     Abnormality of gait      Acute on chronic combined systolic (congestive) and diastolic (congestive) heart failure      Biventricular ICD (implantable cardioverter-defibrillator) in place      BPH (benign prostatic hyperplasia)      CAD (coronary artery disease)      CKD (chronic kidney disease), stage III      Closed nondisplaced fracture of left ischium      Diabetes mellitus      Epigastric pain      Gallstone pancreatitis      GI bleed      Gouty arthropathy      Gross hematuria      Hyperlipidemia      Hypertension      Hyponatremia      Hypothyroidism      Insomnia      Ischemic dilated cardiomyopathy      Mitral insufficiency      Neuropathy, diabetic      Non-rheumatic tricuspid valve insufficiency      Obesity      Paroxysmal atrial fibrillation      Peripheral artery disease      Reaven's syndrome      S/P bilateral below knee amputation      Sleep apnea      Urinary retention            Surgical History:  Past Surgical History:   Procedure Laterality Date     BELOW  KNEE LEG AMPUTATION Left 01/24/2011     BELOW KNEE LEG AMPUTATION Right 07/23/2012     CARDIAC DEFIBRILLATOR PLACEMENT  11/27/2006     CARDIAC PACEMAKER PLACEMENT  09/11/2003     CATARACT EXTRACTION Bilateral      CORONARY ANGIOPLASTY  05/1988     CORONARY ARTERY BYPASS GRAFT  06/28/2000     ESOPHAGOGASTRODUODENOSCOPY  03/10/2009     ICD GENERATOR CHANGE   06/08/2009 1/16/2012     LAPAROSCOPIC CHOLECYSTECTOMY  06/03/2016     LUMBAR LAMINECTOMY  2000     OTHER SURGICAL HISTORY      coronary angiogram x 6       Family History:   Family History   Problem Relation Age of Onset     Other Other      there is a positive family hx of coronary disease       Social History:    Social History     Social History     Marital status:      Spouse name: N/A     Number of children: N/A     Years of education: N/A     Social History Main Topics     Smoking status: Former Smoker     Packs/day: 2.00     Years: 3.00     Types: Cigarettes     Quit date: 1/1/1957     Smokeless tobacco: Never Used     Alcohol use No     Drug use: No     Sexual activity: Not Asked     Other Topics Concern     None     Social History Narrative          Review of Systems   Constitutional:        Patient denies any pain fevers chills nausea or vomiting.  He does not have frederick diarrhea but he complains of continuous loose stools and this is been going on for some time.  He is on an antibiotic at this time and he denies any other issues.  There is no chest pain or shortness of breath and he has no change in vision hearing taste or smell weakness on one side or the other.  He does have bilateral below the knee amputations and he does have ulcers on the top of his stump bilateral.  And these are covered with Mepilex dressing.  The remainder the review of systems is negative.       Vitals:    05/16/17 0732   BP: 106/68   Pulse: 78   Resp: 22   Temp: 98.1  F (36.7  C)   SpO2: 97%       Physical Exam   Constitutional: No distress.   HENT:   Head:  Normocephalic and atraumatic.   Eyes: Right eye exhibits no discharge. Left eye exhibits no discharge. No scleral icterus.   Neck: Neck supple. No thyromegaly present.   Cardiovascular:   Patient's heart sounds are irregularly irregular with adequate rate control.   Pulmonary/Chest: Effort normal and breath sounds normal. No respiratory distress.   Abdominal: Soft. Bowel sounds are normal. He exhibits distension. There is no tenderness.   Genitourinary:   Genitourinary Comments: Bullock catheter in place.   Musculoskeletal:   Patient stump was inspected and he does have ulcers on the top of both stumps which are covered with Mepilex dressing.  We will examine these on wound rounds today when they do dressing changes.   Lymphadenopathy:     He has no cervical adenopathy.   Neurological: He is alert. No cranial nerve deficit. He exhibits normal muscle tone.   Skin: Skin is warm and dry. He is not diaphoretic.   Psychiatric: He has a normal mood and affect. His behavior is normal.       Medication List:  Current Outpatient Prescriptions   Medication Sig     acetaminophen (TYLENOL) 650 MG CR tablet Take 650 mg by mouth every 8 (eight) hours as needed for pain.      allopurinol (ZYLOPRIM) 300 MG tablet Take 100 mg by mouth daily.      atorvastatin (LIPITOR) 20 MG tablet Take 20 mg by mouth daily with supper.     bumetanide (BUMEX) 2 MG tablet Take 4 mg by mouth 2 (two) times a day.     carvedilol (COREG) 25 MG tablet Take 6.25 mg by mouth 2 (two) times a day with meals.      clopidogrel (PLAVIX) 75 mg tablet Take 75 mg by mouth daily.     finasteride (PROSCAR) 5 mg tablet Take 5 mg by mouth daily.     insulin aspart (NOVOLOG) 100 unit/mL injection pen 1u per 15g of carbohydrates plus sliding scale.     insulin aspart (NOVOLOG) 100 unit/mL injection pen Inject under the skin 3 (three) times a day with meals. Per sliding scale: 70 - 149: No corrective insulin, 150 - 199:2 units, 200 - 249:4 units, 250 - 299: 6 units, 300 -  349: 8 units, 350 or greater:10 units and notify MD     isosorbide mononitrate (IMDUR) 60 MG 24 hr tablet Take 60 mg by mouth daily.     levothyroxine (SYNTHROID, LEVOTHROID) 25 MCG tablet Take 50 mcg by mouth Daily before breakfast.      magnesium oxide (MAG-OX) 400 mg tablet Take 400 mg by mouth daily.     miconazole (MICOTIN) 2 % powder Apply 1 application topically 2 (two) times a day.     nitroglycerin (NITROSTAT) 0.4 MG SL tablet Place 0.4 mg under the tongue every 5 (five) minutes as needed.     nystatin, bulk, 10 billion unit Powd Apply 1 application topically 2 (two) times a day as needed. Apply to  bilateral groin until rash resolved     peg 400-propylene glycol (SYSTANE) 0.4-0.3 % Drop Administer 1 drop to both eyes every hour as needed.     potassium chloride SA (K-DUR,KLOR-CON) 20 MEQ tablet Take 30 mEq by mouth 2 (two) times a day with meals.      senna (SENOKOT) 8.6 mg tablet Take 1 tablet by mouth 2 (two) times a day.     SOD PHOS,M-B/NA PHOS,DI-BA (FLEET ENEMA RECT) Insert 1 enema into the rectum daily as needed.     tamsulosin (FLOMAX) 0.4 mg Cp24 Take 0.4 mg by mouth 2 (two) times a day.     traZODone (DESYREL) 50 MG tablet Take 100 mg by mouth at bedtime.      WARFARIN SODIUM (WARFARIN ORAL) Take by mouth. 2.5mg daily   5mg on Sun, Wed, Sat; 2.5 mg all other days       Labs: Hospital labs are as follows; white count was 8.4, hemoglobin was 10.9, inadequate was 34.7, platelets are 208, sodium was 137, potassium was 5.6, creatinine was 1.6, GFR was 42, BUN was 57.      Assessment:    ICD-10-CM    1. Gross hematuria R31.0    2. Benign prostatic hypertrophy N40.0    3. Type 2 diabetes mellitus E11.9    4. Chronic kidney disease N18.9    5. CHF (congestive heart failure) I50.9    6. S/P bilateral BKA (below knee amputation) Z89.512     Z89.511    7. Essential hypertension I10    8. A-fib I48.91    9. Anticoagulation management encounter Z51.81     Z79.01        Plan: Plan at this time is to obtain  urine culture from hospital.  I will check a basic metabolic profile on Thursday secondary to his chronic kidney disease and I will do weights on a daily basis and I will be notified of any 2 pounds weight changes.  Blood sugars will be done 4 times daily and for now his blood sugars are under acceptable range.  I will follow he will follow-up with urology in 3 weeks for voiding trial as recommended in the discharge summary and wound nurse will see to follow his stump wounds.  I will check his stools for C. difficile secondary to persistent diarrhea and I will continue to monitor above medical problems.  No other changes to care plan at this time.  I will continue Coumadin 2.5 mg and check INR on Thursday no other changes to care plan at this time.        Electronically signed by: Mario West DO

## 2021-06-10 NOTE — PROGRESS NOTES
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       HPI:   Maxi is a 81 y.o. male who was recently admitted to the hospital on March 22, 2017 secondary to acute on chronic combined systolic congestive heart failure.  Also with diastolic heart failure and has severe mitral regurg and tricuspid regurg.  He was admitted to the hospital and he does have a history of ischemic dilated cardiomyopathy with status post biventricular ICD placement.  He does have type 2 diabetes and chronic kidney disease with peripheral artery disease and distant bilateral below the knee amputations.  He did present to his cardiology clinic for the second time in a week due to increasing weights at home.  He was sent to the hospital for admission and aggressive diuresis was done.  He did have an angiogram with percutaneous coronary intervention and drug-eluting stent placement on March 27.  After his procedure there was a complication of acute renal failure with a creatinine that peaked at 3.18.  Nephrology was following with aggressive diuresis and eventually dobutamine had to be used to augment the diuresis.  He was weaned off the dobutamine and a family conference was held with palliative care in attendance.  He is a partial code with no chest compressions however he is okay to be shocked or defibrillated.  Intubation is okay at this time and if renal failure does worsen he would be a poor candidate for dialysis.  And his family and patient do reiterate with me today that he would not want to pursue dialysis.  He eventually  became hemodynamically stable and was transferred to the TCU here at [facility] in stable condition.    Patient did have diabetes with a hemoglobin A1c of 6.4 which would mean relatively good control.  He does have an ejection fraction of 25% and his blood pressure was controlled with Coreg 12.5 mg twice daily.  He does have paroxysmal atrial fibrillation which she is in sinus rhythm at this time and he is also anticoagulated with Coumadin.  He does wear prosthesis for his status post high lateral below the knee amputations and his TSH was 8.58 in the hospital and levothyroxine was increased from 25-50 MCQ oral daily.  He does have benign prostatic hypertrophy with urinary retention and he is on Flomax 0.4 mg daily and Proscar 5 mg daily.    He claims that he is very fatigued and tired however he denies specifics and he claims he does not have any chest pain or shortness of breath however I do view his breathing is somewhat labored at this time.  He is relatively comfortable without pain however he has no nausea or vomiting or diarrhea.  He cannot tell me when his last bowel movement was and believes this is a problem however he does have bilateral bowel sounds which are pretty active at this time.  He does have a coccyx wound which I am going to have our wound nurse to see today and we will continue to monitor this closely.    Past Medical History:  Past Medical History:   Diagnosis Date     Abnormality of gait      Acute on chronic combined systolic (congestive) and diastolic (congestive) heart failure      Biventricular ICD (implantable cardioverter-defibrillator) in place      CAD (coronary artery disease)      CKD (chronic kidney disease), stage III      Closed nondisplaced fracture of left ischium      Diabetes mellitus      Epigastric pain      Gallstone pancreatitis      GI bleed      Gouty arthropathy      Hyperlipidemia      Hypertension      Hyponatremia      Hypothyroidism      Insomnia       Ischemic dilated cardiomyopathy      Mitral insufficiency      Neuropathy, diabetic      Non-rheumatic tricuspid valve insufficiency      Obesity      Paroxysmal atrial fibrillation      Peripheral artery disease      Reaven's syndrome      S/P bilateral below knee amputation      Sleep apnea      Urinary retention            Surgical History:  Past Surgical History:   Procedure Laterality Date     BELOW KNEE LEG AMPUTATION Left 01/24/2011     BELOW KNEE LEG AMPUTATION Right 07/23/2012     CARDIAC DEFIBRILLATOR PLACEMENT  11/27/2006     CARDIAC PACEMAKER PLACEMENT  09/11/2003     CATARACT EXTRACTION Bilateral      CORONARY ANGIOPLASTY  05/1988     CORONARY ARTERY BYPASS GRAFT  06/28/2000     ESOPHAGOGASTRODUODENOSCOPY  03/10/2009     ICD GENERATOR CHANGE   06/08/2009 1/16/2012     LAPAROSCOPIC CHOLECYSTECTOMY  06/03/2016     LUMBAR LAMINECTOMY  2000     OTHER SURGICAL HISTORY      coronary angiogram x 6       Family History:   Family History   Problem Relation Age of Onset     Other Other      there is a positive family hx of coronary disease       Social History:    Social History     Social History     Marital status:      Spouse name: N/A     Number of children: N/A     Years of education: N/A     Social History Main Topics     Smoking status: Former Smoker     Packs/day: 2.00     Years: 3.00     Types: Cigarettes     Quit date: 1/1/1957     Smokeless tobacco: Never Used     Alcohol use No     Drug use: No     Sexual activity: Not Asked     Other Topics Concern     None     Social History Narrative          Review of Systems   Constitutional: Positive for activity change and fatigue. Negative for appetite change, chills and fever.   HENT: Negative for hearing loss and rhinorrhea.    Eyes: Negative for visual disturbance.   Respiratory: Positive for shortness of breath. Negative for chest tightness.    Cardiovascular: Negative for chest pain and palpitations.   Gastrointestinal: Negative for abdominal  pain, nausea and vomiting.   Endocrine: Negative for polydipsia, polyphagia and polyuria.   Genitourinary: Negative for decreased urine volume, dysuria and urgency.   Musculoskeletal: Negative for neck pain and neck stiffness.   Skin: Negative for rash.   Neurological: Negative for dizziness, weakness and light-headedness.   Hematological: Negative for adenopathy. Does not bruise/bleed easily.   Psychiatric/Behavioral: Negative for agitation, behavioral problems, confusion, decreased concentration and dysphoric mood. The patient is not nervous/anxious.        Vitals:    04/11/17 1259   BP: 127/74   Pulse: 84   Resp: 18   Temp: (!) 95.9  F (35.5  C)   SpO2: 95%       Physical Exam   Constitutional: He appears well-developed and well-nourished. No distress.   HENT:   Head: Normocephalic and atraumatic.   Nose: Nose normal.   Mouth/Throat: No oropharyngeal exudate.   Eyes: Right eye exhibits no discharge. Left eye exhibits no discharge. No scleral icterus.   Cardiovascular: Normal rate and regular rhythm.    Murmur heard.  Pulmonary/Chest:   Patient has poor inspiratory volume with occasional crackles at the bases.  There was no wheezes noted.   Abdominal: Soft. Bowel sounds are normal. He exhibits distension. There is no tenderness.   Musculoskeletal:   Patient is bilateral below the knee amputation.   Neurological: He is alert. He displays normal reflexes. No cranial nerve deficit. He exhibits normal muscle tone.   Skin: Skin is warm and dry. He is not diaphoretic.   Psychiatric: He has a normal mood and affect. His behavior is normal.       Medication List:  Current Outpatient Prescriptions   Medication Sig     acetaminophen (TYLENOL) 650 MG CR tablet Take 650 mg by mouth every 4 (four) hours as needed.     allopurinol (ZYLOPRIM) 300 MG tablet Take 300 mg by mouth daily.     aspirin 81 mg chewable tablet Chew 81 mg daily.     atorvastatin (LIPITOR) 20 MG tablet Take 20 mg by mouth daily with supper.     bumetanide  (BUMEX) 2 MG tablet Take 4 mg by mouth 2 (two) times a day.     carvedilol (COREG) 25 MG tablet Take 12.5 mg by mouth 2 (two) times a day with meals.     clopidogrel (PLAVIX) 75 mg tablet Take 75 mg by mouth daily.     finasteride (PROSCAR) 5 mg tablet Take 5 mg by mouth daily.     hydrALAZINE (APRESOLINE) 25 MG tablet Take 12.5 mg by mouth 2 (two) times a day.     HYDROcodone-acetaminophen 5-325 mg per tablet Take 2 tablets by mouth every 8 (eight) hours as needed.     insulin NPH human recomb (NOVOLIN N INNOLET/HUMULIN N KWIKPEN) 100 unit/mL (3 mL) pen Inject 18 Units under the skin daily before supper.     insulin NPH human recomb (NOVOLIN N INNOLET/HUMULIN N KWIKPEN) 100 unit/mL (3 mL) pen Inject 24 Units under the skin Daily before breakfast.     insulin regular (NOVOLIN R) 100 unit/mL injection Inject 10 Units under the skin 2 (two) times a day before meals.     isosorbide mononitrate (IMDUR) 60 MG 24 hr tablet Take 60 mg by mouth daily.     levothyroxine (SYNTHROID, LEVOTHROID) 25 MCG tablet Take 50 mcg by mouth daily.     magnesium oxide (MAG-OX) 400 mg tablet Take 400 mg by mouth daily.     metOLazone (ZAROXOLYN) 5 MG tablet Take 5 mg by mouth daily.     miconazole (MICOTIN) 2 % powder Apply 1 application topically 2 (two) times a day.     nitroglycerin (NITROSTAT) 0.4 MG SL tablet Place 0.4 mg under the tongue every 5 (five) minutes as needed.     nystatin, bulk, 10 billion unit Powd Apply 1 application topically 2 (two) times a day as needed. Apply to  bilateral groin until rash resolved     peg 400-propylene glycol (SYSTANE) 0.4-0.3 % Drop Administer 1 drop to both eyes every hour as needed.     potassium chloride SA (K-DUR,KLOR-CON) 20 MEQ tablet Take 20 mEq by mouth daily. Every Mon, Tue, Wed, Thu, Fri     tamsulosin (FLOMAX) 0.4 mg Cp24 Take 0.4 mg by mouth 2 (two) times a day.     traZODone (DESYREL) 50 MG tablet Take 100 mg by mouth daily.     WARFARIN SODIUM (WARFARIN ORAL) Take by mouth. 5mg on  Sun, Wed, Sat; 2.5 mg all other days       Labs: Hospital labs are as follows; sodium was 135, potassium was 4.2, CO2 total was 29.  Creatinine was 2.29, BUN was 121, magnesium was 2.9,.      Assessment:    ICD-10-CM    1. Combined congestive systolic and diastolic heart failure I50.40    2. Diabetes mellitus E11.9    3. Hypertension I10    4. Chronic kidney disease N18.9    5. Hypothyroidism E03.9    6. Coronary artery disease I25.10        Plan: Plan at this time is to check a BNP tomorrow as well as a basic metabolic profile.  This is done secondary to his congestive heart failure with his chronic kidney disease.  Weights will be checked on a daily basis and report any 2 pound weight change in 1 day or and 5 pounds in a week.  I will also clarify the potassium chloride orders he was sent here with 20 mEq 5 times a week.  It was not clear on which days so I did do the math and if he did 15 mEq he would only get 5 extra milliequivalents a week.  We will continue to monitor patient's potassium through his basic metabolic profile and this is better to decrease risk of med errors.  I will have our wound nurse see for his coccyx wound and we will do the same dose of Coumadin and recheck the INR on Thursday.  We will continue to monitor above medical problems and no other changes to care plan at this time.  Greater than 1 hour was spent on this critical care admission with greater than 50% of the time dedicated to coordination of care.  Because of the patient's current health with his chronic kidney disease as well as his low ejection fraction with his systolic and diastolic heart failure I did discuss with him palliative cares.  He does not believe he is ready for hospice at this time and he is a partial DO NOT RESUSCITATE with do not do chest compressions however intubation is okay as well as defibrillation.  No other changes to care plan at this time.        Electronically signed by: Mario West DO

## 2021-06-10 NOTE — PROGRESS NOTES
[facility]    Facility:   [facility] [[id number]]   Code Status: FULL CODE      CHIEF COMPLAINT/REASON FOR VISIT:  Chief Complaint   Patient presents with     Follow Up     labs, rehab       HISTORY:      HPI: Maxi is a 81 y.o. male who I had a chance to revisit with today secondary to his hospitalization March 22 secondary to acute on chronic combined systolic and diastolic heart failure in the setting of CAD.  Currently has been normotensive and afebrile.  He has done some therapy already today.  Had a chance to speak with therapy and he does have periods of good days and bad days and sometimes they have had meetings wondering if he needs to go home soon he is actually possibly hoping to go home soon.  He has been normotensive and afebrile.  He does have a rectal fissure currently getting some creams.  Bullock catheter.  Is on diuretics and potassium next BMP is on May 8.  Next pro time May 9.  Takes Tylenol for pain.  Blood sugars fine.    Past Medical History:   Diagnosis Date     Abnormality of gait      Acute on chronic combined systolic (congestive) and diastolic (congestive) heart failure      Biventricular ICD (implantable cardioverter-defibrillator) in place      CAD (coronary artery disease)      CKD (chronic kidney disease), stage III      Closed nondisplaced fracture of left ischium      Diabetes mellitus      Epigastric pain      Gallstone pancreatitis      GI bleed      Gouty arthropathy      Hyperlipidemia      Hypertension      Hyponatremia      Hypothyroidism      Insomnia      Ischemic dilated cardiomyopathy      Mitral insufficiency      Neuropathy, diabetic      Non-rheumatic tricuspid valve insufficiency      Obesity      Paroxysmal atrial fibrillation      Peripheral artery disease      Reaven's syndrome      S/P bilateral below knee amputation      Sleep apnea      Urinary retention              Family History   Problem Relation Age of Onset     Other  Other      there is a positive family hx of coronary disease     Social History     Social History     Marital status:      Spouse name: N/A     Number of children: N/A     Years of education: N/A     Social History Main Topics     Smoking status: Former Smoker     Packs/day: 2.00     Years: 3.00     Types: Cigarettes     Quit date: 1/1/1957     Smokeless tobacco: Never Used     Alcohol use No     Drug use: No     Sexual activity: Not on file     Other Topics Concern     Not on file     Social History Narrative         Review of Systems  Currently denies chills fever coughing wheezing chest pain dizziness flulike symptoms rashes sores stiff neck or swollen glands. A history of combined systolic and diastolic congestive heart failure diabetes hypertension A. fib chronic kidney disease hypothyroidism BPH.    Current Outpatient Prescriptions   Medication Sig     acetaminophen (TYLENOL) 650 MG CR tablet Take 650 mg by mouth every 4 (four) hours as needed.     allopurinol (ZYLOPRIM) 300 MG tablet Take 300 mg by mouth daily.     aspirin 81 mg chewable tablet Chew 81 mg daily.     atorvastatin (LIPITOR) 20 MG tablet Take 20 mg by mouth daily with supper.     bumetanide (BUMEX) 2 MG tablet Take 4 mg by mouth 2 (two) times a day.     carvedilol (COREG) 25 MG tablet Take 12.5 mg by mouth 2 (two) times a day with meals.     clopidogrel (PLAVIX) 75 mg tablet Take 75 mg by mouth daily.     finasteride (PROSCAR) 5 mg tablet Take 5 mg by mouth daily.     HYDROcodone-acetaminophen 5-325 mg per tablet Take 2 tablets by mouth every 8 (eight) hours as needed.     insulin NPH human recomb (NOVOLIN N INNOLET/HUMULIN N KWIKPEN) 100 unit/mL (3 mL) pen Inject 18 Units under the skin daily before supper.     insulin NPH human recomb (NOVOLIN N INNOLET/HUMULIN N KWIKPEN) 100 unit/mL (3 mL) pen Inject 24 Units under the skin Daily before breakfast.     insulin regular (NOVOLIN R) 100 unit/mL injection Inject 10 Units under the skin 2  (two) times a day before meals.     isosorbide mononitrate (IMDUR) 60 MG 24 hr tablet Take 60 mg by mouth daily.     levothyroxine (SYNTHROID, LEVOTHROID) 25 MCG tablet Take 50 mcg by mouth daily.     magnesium oxide (MAG-OX) 400 mg tablet Take 400 mg by mouth daily.     miconazole (MICOTIN) 2 % powder Apply 1 application topically 2 (two) times a day.     nitroglycerin (NITROSTAT) 0.4 MG SL tablet Place 0.4 mg under the tongue every 5 (five) minutes as needed.     nystatin, bulk, 10 billion unit Powd Apply 1 application topically 2 (two) times a day as needed. Apply to  bilateral groin until rash resolved     peg 400-propylene glycol (SYSTANE) 0.4-0.3 % Drop Administer 1 drop to both eyes every hour as needed.     potassium chloride SA (K-DUR,KLOR-CON) 20 MEQ tablet Take 20 mEq by mouth 2 (two) times a day. \     tamsulosin (FLOMAX) 0.4 mg Cp24 Take 0.4 mg by mouth 2 (two) times a day.     traZODone (DESYREL) 50 MG tablet Take 100 mg by mouth daily.     WARFARIN SODIUM (WARFARIN ORAL) Take by mouth. 5mg on Sun, Wed, Sat; 2.5 mg all other days       .  Vitals:    05/05/17 1817   BP: 116/68   Pulse: 82   Resp: 18   Temp: 96.6  F (35.9  C)       Physical Exam   Constitutional: No distress.   HENT:   Head: Normocephalic.   Eyes: Pupils are equal, round, and reactive to light.   Neck: Neck supple. No thyromegaly present.   Cardiovascular:   Irregularity. Mitral valve.   Pulmonary/Chest: Breath sounds normal.   Abdominal: Bowel sounds are normal. There is no tenderness. There is no guarding.   Genitourinary:   Genitourinary Comments: BPH. lino  Musculoskeletal:   Bilateral below-knee amputations with prosthetics. Generalized weakness.   Lymphadenopathy:   He has no cervical adenopathy.   Neurological: He is alert.   Skin: Skin is warm and dry. No rash noted.      LABS:   Results for orders placed or performed in visit on 05/02/17   Basic Metabolic Panel   Result Value Ref Range    Sodium 137 136 - 145 mmol/L     Potassium 4.2 3.5 - 5.0 mmol/L    Chloride 93 (L) 98 - 107 mmol/L    CO2 36 (H) 22 - 31 mmol/L    Anion Gap, Calculation 8 5 - 18 mmol/L    Glucose 220 (H) 70 - 125 mg/dL    Calcium 9.2 8.5 - 10.5 mg/dL    BUN 74 (H) 8 - 28 mg/dL    Creatinine 1.55 (H) 0.70 - 1.30 mg/dL    GFR MDRD Af Amer 52 (L) >60 mL/min/1.73m2    GFR MDRD Non Af Amer 43 (L) >60 mL/min/1.73m2       Lab Results   Component Value Date    WBC 9.5 06/16/2016    HGB 10.8 (L) 04/25/2017    HCT 34.9 (L) 06/16/2016    MCV 99 06/16/2016     06/16/2016         ASSESSMENT:      ICD-10-CM    1. S/P bilateral BKA (below knee amputation) Z89.512     Z89.511    2. Diabetes mellitus E11.9    3. Essential hypertension I10    4. Physical deconditioning R53.81        PLAN:    He does have a BMP ordered for the eighth and also pro time ordered for the ninth.  Continue with current medications and treatments.  Had a chance to speak with him about concerns regarding his therapy and his overall care.  He does feel that he is having some better days than bad days.        Electronically signed by: Michael Duane Johnson, CNP

## 2021-06-10 NOTE — PROGRESS NOTES
[facility]    Facility:   [facility] [[id number]]   Code Status: FULL CODE      CHIEF COMPLAINT/REASON FOR VISIT:  Chief Complaint   Patient presents with     Follow Up     labs       HISTORY:      HPI: Maxi is a 81 y.o. male who I had the pleasure of visiting with today secondary to his chronic medical conditions including his hospitalization March 22 - April 10 secondary acute on chronic combined systolic and diastolic congestive heart failure in the setting of CAD and severe mitral regurgitation.  Did get his laboratory studies back yesterday he is BUN and creatinine are elevated along with a chloride his potassium is decreased we will get another level today he has been on potassium 15 mEq twice daily we had an extra 20 yesterday and will get another level today.  He is not having any edema or CHF problems.  Will discontinue the metolazone.  He also is still on Bumex 4 mg twice daily.  He has been normotensive and afebrile.  Try to do a weaning process from the oxygen.  Bullock catheter.  History of hyperuricemia he is on allopurinol 300 mg but due to the current kidney function tests will decrease it 100 mg daily.  His slums 6/30.  Talked about an IV explained the risks and benefits of having that he does not want that he promises to drink a lot of fluid but again will also get rid of the metolazone he does not need it there is no current respiratory or CHF problems.  We will also recheck the BMP later on this week.  Normotensive.  Afebrile.  Working out in therapy.  Blood sugars ranging .    Past Medical History:   Diagnosis Date     Abnormality of gait      Acute on chronic combined systolic (congestive) and diastolic (congestive) heart failure      Biventricular ICD (implantable cardioverter-defibrillator) in place      CAD (coronary artery disease)      CKD (chronic kidney disease), stage III      Closed nondisplaced fracture of left ischium      Diabetes  mellitus      Epigastric pain      Gallstone pancreatitis      GI bleed      Gouty arthropathy      Hyperlipidemia      Hypertension      Hyponatremia      Hypothyroidism      Insomnia      Ischemic dilated cardiomyopathy      Mitral insufficiency      Neuropathy, diabetic      Non-rheumatic tricuspid valve insufficiency      Obesity      Paroxysmal atrial fibrillation      Peripheral artery disease      Reaven's syndrome      S/P bilateral below knee amputation      Sleep apnea      Urinary retention              Family History   Problem Relation Age of Onset     Other Other      there is a positive family hx of coronary disease     Social History     Social History     Marital status:      Spouse name: N/A     Number of children: N/A     Years of education: N/A     Social History Main Topics     Smoking status: Former Smoker     Packs/day: 2.00     Years: 3.00     Types: Cigarettes     Quit date: 1/1/1957     Smokeless tobacco: Never Used     Alcohol use No     Drug use: No     Sexual activity: Not on file     Other Topics Concern     Not on file     Social History Narrative         Review of Systems  Currently denies chills fever coughing wheezing chest pain dizziness flulike symptoms rashes sores stiff neck or swollen glands. A history of combined systolic and diastolic congestive heart failure diabetes hypertension A. fib chronic kidney disease hypothyroidism BPH.     Current Outpatient Prescriptions   Medication Sig     acetaminophen (TYLENOL) 650 MG CR tablet Take 650 mg by mouth every 4 (four) hours as needed.     allopurinol (ZYLOPRIM) 300 MG tablet Take 300 mg by mouth daily.     aspirin 81 mg chewable tablet Chew 81 mg daily.     atorvastatin (LIPITOR) 20 MG tablet Take 20 mg by mouth daily with supper.     bumetanide (BUMEX) 2 MG tablet Take 4 mg by mouth 2 (two) times a day.     carvedilol (COREG) 25 MG tablet Take 12.5 mg by mouth 2 (two) times a day with meals.     clopidogrel (PLAVIX) 75 mg  tablet Take 75 mg by mouth daily.     finasteride (PROSCAR) 5 mg tablet Take 5 mg by mouth daily.     hydrALAZINE (APRESOLINE) 25 MG tablet Take 12.5 mg by mouth 2 (two) times a day.     HYDROcodone-acetaminophen 5-325 mg per tablet Take 2 tablets by mouth every 8 (eight) hours as needed.     insulin NPH human recomb (NOVOLIN N INNOLET/HUMULIN N KWIKPEN) 100 unit/mL (3 mL) pen Inject 18 Units under the skin daily before supper.     insulin NPH human recomb (NOVOLIN N INNOLET/HUMULIN N KWIKPEN) 100 unit/mL (3 mL) pen Inject 24 Units under the skin Daily before breakfast.     insulin regular (NOVOLIN R) 100 unit/mL injection Inject 10 Units under the skin 2 (two) times a day before meals.     isosorbide mononitrate (IMDUR) 60 MG 24 hr tablet Take 60 mg by mouth daily.     levothyroxine (SYNTHROID, LEVOTHROID) 25 MCG tablet Take 50 mcg by mouth daily.     magnesium oxide (MAG-OX) 400 mg tablet Take 400 mg by mouth daily.     miconazole (MICOTIN) 2 % powder Apply 1 application topically 2 (two) times a day.     nitroglycerin (NITROSTAT) 0.4 MG SL tablet Place 0.4 mg under the tongue every 5 (five) minutes as needed.     nystatin, bulk, 10 billion unit Powd Apply 1 application topically 2 (two) times a day as needed. Apply to  bilateral groin until rash resolved     peg 400-propylene glycol (SYSTANE) 0.4-0.3 % Drop Administer 1 drop to both eyes every hour as needed.     potassium chloride SA (K-DUR,KLOR-CON) 20 MEQ tablet Take 20 mEq by mouth daily. Every Mon, Tue, Wed, Thu, Fri     tamsulosin (FLOMAX) 0.4 mg Cp24 Take 0.4 mg by mouth 2 (two) times a day.     traZODone (DESYREL) 50 MG tablet Take 100 mg by mouth daily.     WARFARIN SODIUM (WARFARIN ORAL) Take by mouth. 5mg on Sun, Wed, Sat; 2.5 mg all other days       .  Vitals:    04/18/17 1209   BP: 126/64   Pulse: 70   Resp: 20   Temp: 97.4  F (36.3  C)       Physical Exam  Constitutional: No distress.   HENT:   Head: Normocephalic.   Eyes: Pupils are equal, round,  and reactive to light.   Neck: Neck supple. No thyromegaly present.   Cardiovascular:   Irregularity. Mitral valve.   Pulmonary/Chest: Breath sounds normal.   Abdominal: Bowel sounds are normal. There is no tenderness. There is no guarding.   Genitourinary:   Genitourinary Comments: BPH.   Musculoskeletal:   Bilateral below-knee amputations with prosthetics. Generalized weakness.   Lymphadenopathy:   He has no cervical adenopathy.   Neurological: He is alert.   Skin: Skin is warm and dry. No rash noted.     LABS:   Lab Results   Component Value Date    WBC 9.5 06/16/2016    HGB 10.1 (L) 04/17/2017    HCT 34.9 (L) 06/16/2016    MCV 99 06/16/2016     06/16/2016     Results for orders placed or performed in visit on 04/17/17   Basic Metabolic Panel   Result Value Ref Range    Sodium 136 136 - 145 mmol/L    Potassium 2.8 (LL) 3.5 - 5.0 mmol/L    Chloride 81 (L) 98 - 107 mmol/L    CO2 43 (HH) 22 - 31 mmol/L    Anion Gap, Calculation 12 5 - 18 mmol/L    Glucose 134 (H) 70 - 125 mg/dL    Calcium 9.3 8.5 - 10.5 mg/dL     (H) 8 - 28 mg/dL    Creatinine 1.99 (H) 0.70 - 1.30 mg/dL    GFR MDRD Af Amer 39 (L) >60 mL/min/1.73m2    GFR MDRD Non Af Amer 32 (L) >60 mL/min/1.73m2       No results found for: HGBA1C      ASSESSMENT:      ICD-10-CM    1. CHF (congestive heart failure) I50.9    2. CKD (chronic kidney disease) N18.9    3. Essential hypertension I10    4. Azotemia R79.89        PLAN:    He did get extra potassium yesterday.  We are checking the potassium again today he currently is on 15 mEq twice daily.  Discontinue the metolazone 5 mg continue with the Bumex.  Also continue with potassium pending the results back from today.  Currently no other weight changes.  Is on oxygen but a weaning process.  Accu-Cheks are good decrease allopurinol 100 mg rather than 300 mg due to the kidney function tests.  Try to encourage him to get an IV today he does not want that and has refused it he promises to drink a lot of  fluid and then staff will also have to measure his output.  Bullock catheter.      For documentation purposes total visit was over 35 minutes to which over 50% was spent with the patient going over his care his medications his laboratory studies discontinuing and decreasing some of his meds but also waiting the potassium ordered for today and making appropriate adjustments along with coordination of care.      Electronically signed by: Michael Duane Johnson, CNP

## 2021-06-11 NOTE — PROGRESS NOTES
[facility]    Facility:   [facility] [[id number]]   Code Status: FULL CODE  PCP: Hima Barba MD   Phone: [phone]   Fax: [phone]      CHIEF COMPLAINT/REASON FOR VISIT:  Chief Complaint   Patient presents with     Discharge Summary       HISTORY COURSE:  Maxi is a 81 y.o. male who was recently admitted to the hospital on March 22, 2017 secondary to acute on chronic combined systolic congestive heart failure.  Also with diastolic heart failure and has severe mitral regurg and tricuspid regurg.  He was admitted to the hospital and he does have a history of ischemic dilated cardiomyopathy with status post biventricular ICD placement.  He does have type 2 diabetes and chronic kidney disease with peripheral artery disease and distant bilateral below the knee amputations.  He did present to his cardiology clinic for the second time in a week due to increasing weights at home.  He was sent to the hospital for admission and aggressive diuresis was done.  He did have an angiogram with percutaneous coronary intervention and drug-eluting stent placement on March 27.  After his procedure there was a complication of acute renal failure with a creatinine that peaked at 3.18.  Nephrology was following with aggressive diuresis and eventually dobutamine had to be used to augment the diuresis.  He was weaned off the dobutamine and a family conference was held with palliative care in attendance.  He is a partial code with no chest compressions however he is okay to be shocked or defibrillated.  Intubation is okay at this time and if renal failure does worsen he would be a poor candidate for dialysis.  And his family and patient do reiterate with me today that he would not want to pursue dialysis.  He eventually became hemodynamically stable and was transferred to the TCU here at [facility] in stable condition.    Maxi is a 81 y.o. male who was residing here at the  [facility] secondary to weakness.  He does have a history of atrial fibrillation and is currently anticoagulated with Coumadin.  He was progressing well according to my nurse practitioner however he did go to the hospital on 5/11/2017 secondary to gross hematuria.  It was unclear actually what his hemoglobin was at this time but he was brought to the hospital.  His INR was 3 and he was started on irrigation and anticoagulation was held.  CT scan of the abdomen did not reveal any acute findings from a urinary tract perspective and irrigation was stopped and his urination did clear.  His Coumadin was restarted on 5/13/2017 and he did have a voiding trial on 514 but the Bullock needed to be reinserted.  He continued to have urinary retention and abdominal pain and he will follow-up with urology in about 3 weeks for a voiding trial.  He did continue Flomax and finasteride for urinary retention in his urine culture did grow out E. coli.  Urinary tract infection may have been contributing to this and he did complete a total 7 day course of antibiotics.  He was sent out on Ceftin near and urine cultures is still in process.  Patient had pleural calcifications/possible nodule noted on CT scan of the abdomen and radiology recommended a lung CT with IV contrast this was not done secondary to significant renal impairment months ago from contrast dye.  And we will continue to monitor.  Patient was recommended to be on both Plavix and Coumadin and follow-up with cardiac cardiology as recommended.  He did have type 2 diabetes and he was on NPH insulin prior to admission 24 units before breakfast and 18 units before dinner.  Patient's blood sugars here are running anywhere from 97 up to 184.  Since he was admitted yesterday he was 149, 146, 184, and 147.  He was treated appropriately and transferred here to the TCU at [facility] in stable      He has been on the transitional care unit intermittently since  April 2017.  He also Nancy 15, 2017 did require a suprapubic Bullock catheter.  He has not had any problems since that procedure and he will be having a visit with urology for follow-up.  He has been normotensive.  Did decrease his Coreg 6.25 mg twice daily.  He is on Coumadin pretty stable at 3 mg.  Regarding his diabetes his blood sugars have been steady ranging anywhere from 1  and for the most part he has been on sliding scale he wants to start counting carbs and going back to his long-acting when he gets home we did offer that a number of times on the transitional care unit and talked about appropriate diabetic care.  He also did participate in therapy secondary to his generalized debility and deconditioning.  He is done quite well overall.  He does feel comfortable upon going home.  Review of Systems  Currently denies chills fever coughing wheezing chest pain dizziness flulike symptoms rashes sores stiff neck or swollen glands. A history of combined systolic and diastolic congestive heart failure diabetes hypertension A. fib chronic kidney disease hypothyroidism BPH.  Suprapubic Bullock catheter.   Vitals:    06/27/17 1910   BP: 116/71   Pulse: 72   Resp: 20   Temp: 97.2  F (36.2  C)       Physical Exam  Constitutional: No distress.   HENT:   Head: Normocephalic.   Eyes: Pupils are equal, round, and reactive to light.   Neck: Neck supple. No thyromegaly present.   Cardiovascular:   Irregularity. Mitral valve.   Pulmonary/Chest: Breath sounds normal.   Abdominal: Bowel sounds are normal. There is no tenderness. There is no guarding.   Genitourinary: Suprapubic Bullock catheter   Genitourinary Comments: BPH.   Musculoskeletal:   Bilateral below-knee amputations with prosthetics. Generalized weakness.   Lymphadenopathy:   He has no cervical adenopathy.      Lab Results   Component Value Date    WBC 9.5 06/16/2016    HGB 10.8 (L) 04/25/2017    HCT 34.9 (L) 06/16/2016    MCV 99 06/16/2016     06/16/2016      Results for orders placed or performed in visit on 06/08/17   Basic Metabolic Panel   Result Value Ref Range    Sodium 137 136 - 145 mmol/L    Potassium 4.1 3.5 - 5.0 mmol/L    Chloride 98 98 - 107 mmol/L    CO2 29 22 - 31 mmol/L    Anion Gap, Calculation 10 5 - 18 mmol/L    Glucose 245 (H) 70 - 125 mg/dL    Calcium 8.9 8.5 - 10.5 mg/dL    BUN 43 (H) 8 - 28 mg/dL    Creatinine 1.48 (H) 0.70 - 1.30 mg/dL    GFR MDRD Af Amer 55 (L) >60 mL/min/1.73m2    GFR MDRD Non Af Amer 46 (L) >60 mL/min/1.73m2       No results found for: HGBA1C    MEDICATION LIST:  Current Outpatient Prescriptions   Medication Sig     acetaminophen (TYLENOL) 650 MG CR tablet Take 650 mg by mouth every 8 (eight) hours as needed for pain.      allopurinol (ZYLOPRIM) 300 MG tablet Take 100 mg by mouth daily.      atorvastatin (LIPITOR) 20 MG tablet Take 20 mg by mouth daily with supper.     bumetanide (BUMEX) 2 MG tablet Take 4 mg by mouth 2 (two) times a day.     carvedilol (COREG) 25 MG tablet Take 6.25 mg by mouth 2 (two) times a day with meals.      clopidogrel (PLAVIX) 75 mg tablet Take 75 mg by mouth daily.     finasteride (PROSCAR) 5 mg tablet Take 5 mg by mouth daily.     insulin aspart (NOVOLOG) 100 unit/mL injection pen 1u per 15g of carbohydrates plus sliding scale.     insulin aspart (NOVOLOG) 100 unit/mL injection pen Inject under the skin 3 (three) times a day with meals. Per sliding scale: 70 - 149: No corrective insulin, 150 - 199:2 units, 200 - 249:4 units, 250 - 299: 6 units, 300 - 349: 8 units, 350 or greater:10 units and notify MD     isosorbide mononitrate (IMDUR) 60 MG 24 hr tablet Take 60 mg by mouth daily.     levothyroxine (SYNTHROID, LEVOTHROID) 25 MCG tablet Take 50 mcg by mouth Daily before breakfast.      magnesium oxide (MAG-OX) 400 mg tablet Take 400 mg by mouth daily.     miconazole (MICOTIN) 2 % powder Apply 1 application topically 2 (two) times a day.     nitroglycerin (NITROSTAT) 0.4 MG SL tablet Place 0.4 mg  under the tongue every 5 (five) minutes as needed.     nystatin, bulk, 10 billion unit Powd Apply 1 application topically 2 (two) times a day as needed. Apply to  bilateral groin until rash resolved     peg 400-propylene glycol (SYSTANE) 0.4-0.3 % Drop Administer 1 drop to both eyes every hour as needed.     potassium chloride SA (K-DUR,KLOR-CON) 20 MEQ tablet Take 30 mEq by mouth 2 (two) times a day with meals.      senna (SENOKOT) 8.6 mg tablet Take 1 tablet by mouth 2 (two) times a day.     SOD PHOS,M-B/NA PHOS,DI-BA (FLEET ENEMA RECT) Insert 1 enema into the rectum daily as needed.     tamsulosin (FLOMAX) 0.4 mg Cp24 Take 0.4 mg by mouth 2 (two) times a day.     traZODone (DESYREL) 50 MG tablet Take 100 mg by mouth at bedtime.      WARFARIN SODIUM (WARFARIN ORAL) Take by mouth. 2.5mg daily   5mg on Sun, Wed, Sat; 2.5 mg all other days       DISCHARGE DIAGNOSIS:    ICD-10-CM    1. Chronic indwelling Bullock catheter Z92.89    2. Diabetes mellitus E11.9    3. Essential hypertension I10    4. CKD (chronic kidney disease) N18.9        MEDICAL EQUIPMENT NEEDS:  walker2    DISCHARGE PLAN/FACE TO FACE:  I certify that services are/were furnished while this patient was under the care of a physician and that a physician or an allowed non-physician practitioner (NPP), had a face-to-face encounter that meets the physician face-to-face encounter requirements. The encounter was in whole, or in part, related to the primary reason for home health. The patient is confined to his/her home and needs intermittent skilled nursing, physical therapy, speech-language pathology, or the continued need for occupational therapy. A plan of care has been established by a physician and is periodically reviewed by a physician.  Date of Face-to-Face Encounter: June 27, 2017    I certify that, based on my findings, the following services are medically necessary home health services: Discharging to home with current medications with [facility] for physical and occupational therapy home health aide and nursing     My clinical findings support the need for the above skilled services because: (Please write a brief narrative summary that describes what the RN, PT, SLP, or other services will be doing in the home. A list of diagnoses in this section does not meet the CMS requirements.)  Secondary to chronic medical conditions and medication management     This patient is homebound because: (Please write a brief narrative summary describing the functional limitations as to why this patient is homebound and specifically what makes this patient homebound.)  Secondary to chronic medical conditions and generalized debility    The patient is, or has been, under my care and I have initiated the establishment of the plan of care. This patient will be followed by a physician who will periodically review the plan of care.  He will follow-up with urology for suprapubic Bullock catheter.  He will follow-up with his primary care doctor regarding medication management as well as continue evaluation and assessment of his chronic medical conditions including diabetes and insulin management.  He also still is on 3 mg of Coumadin rechecking a pro time next week.    Discharge coordination care greater than 30 minutes.    Electronically signed by: Michael Duane Johnson, CNP

## 2021-06-11 NOTE — PROGRESS NOTES
[facility]    Facility:   [facility] [[id number]]   Code Status: FULL CODE      CHIEF COMPLAINT/REASON FOR VISIT:  Chief Complaint   Patient presents with     Follow Up     lino lino       HISTORY:      HPI: Maxi is a 81 y.o. male who I had the pleasure to visit with secondary to his multiple chronic medical conditions and is a most recently this week he did have a suprapubic Lino catheter placed.  The bag actually looks pretty good very minimal hematuria.  His blood sugars ranging 148-230.  Once again is starting therapy to improve his strength and balance although he feels he has done pretty good overall.  He has been normotensive and afebrile.  No pain issues.  Had a pleasant visit with him and probably next week will be discharging to home in the meantime he is going to meet with his sons this weekend for a Father's Day get together.    Past Medical History:   Diagnosis Date     Abnormality of gait      Acute on chronic combined systolic (congestive) and diastolic (congestive) heart failure      Biventricular ICD (implantable cardioverter-defibrillator) in place      BPH (benign prostatic hyperplasia)      CAD (coronary artery disease)      CKD (chronic kidney disease), stage III      Closed nondisplaced fracture of left ischium      Diabetes mellitus      Epigastric pain      Gallstone pancreatitis      GI bleed      Gouty arthropathy      Gross hematuria      Hyperlipidemia      Hypertension      Hyponatremia      Hypothyroidism      Insomnia      Ischemic dilated cardiomyopathy      Mitral insufficiency      Neuropathy, diabetic      Non-rheumatic tricuspid valve insufficiency      Obesity      Paroxysmal atrial fibrillation      Peripheral artery disease      Reaven's syndrome      S/P bilateral below knee amputation      Sleep apnea      Urinary retention              Family History   Problem Relation Age of Onset     Other Other      there is a positive family  hx of coronary disease     Social History     Social History     Marital status:      Spouse name: N/A     Number of children: N/A     Years of education: N/A     Social History Main Topics     Smoking status: Former Smoker     Packs/day: 2.00     Years: 3.00     Types: Cigarettes     Quit date: 1/1/1957     Smokeless tobacco: Never Used     Alcohol use No     Drug use: No     Sexual activity: Not on file     Other Topics Concern     Not on file     Social History Narrative         Review of Systems  Currently denies chills fever coughing wheezing chest pain dizziness flulike symptoms rashes sores stiff neck or swollen glands. A history of combined systolic and diastolic congestive heart failure diabetes hypertension A. fib chronic kidney disease hypothyroidism BPH.        Current Outpatient Prescriptions:      acetaminophen (TYLENOL) 650 MG CR tablet, Take 650 mg by mouth every 8 (eight) hours as needed for pain. , Disp: , Rfl:      allopurinol (ZYLOPRIM) 300 MG tablet, Take 100 mg by mouth daily. , Disp: , Rfl:      atorvastatin (LIPITOR) 20 MG tablet, Take 20 mg by mouth daily with supper., Disp: , Rfl:      bumetanide (BUMEX) 2 MG tablet, Take 4 mg by mouth 2 (two) times a day., Disp: , Rfl:      carvedilol (COREG) 25 MG tablet, Take 12.5 mg by mouth 2 (two) times a day with meals., Disp: , Rfl:      clopidogrel (PLAVIX) 75 mg tablet, Take 75 mg by mouth daily., Disp: , Rfl:      finasteride (PROSCAR) 5 mg tablet, Take 5 mg by mouth daily., Disp: , Rfl:      insulin aspart (NOVOLOG) 100 unit/mL injection pen, 1u per 15g of carbohydrates plus sliding scale., Disp: , Rfl:      insulin aspart (NOVOLOG) 100 unit/mL injection pen, Inject under the skin 3 (three) times a day with meals. Per sliding scale: 70 - 149: No corrective insulin, 150 - 199:2 units, 200 - 249:4 units, 250 - 299: 6 units, 300 - 349: 8 units, 350 or greater:10 units and notify MD, Disp: , Rfl:      isosorbide mononitrate (IMDUR) 60 MG 24  hr tablet, Take 60 mg by mouth daily., Disp: , Rfl:      levothyroxine (SYNTHROID, LEVOTHROID) 25 MCG tablet, Take 50 mcg by mouth Daily before breakfast. , Disp: , Rfl:      magnesium oxide (MAG-OX) 400 mg tablet, Take 400 mg by mouth daily., Disp: , Rfl:      miconazole (MICOTIN) 2 % powder, Apply 1 application topically 2 (two) times a day., Disp: , Rfl:      nitroglycerin (NITROSTAT) 0.4 MG SL tablet, Place 0.4 mg under the tongue every 5 (five) minutes as needed., Disp: , Rfl:      nystatin, bulk, 10 billion unit Powd, Apply 1 application topically 2 (two) times a day as needed. Apply to  bilateral groin until rash resolved, Disp: , Rfl:      peg 400-propylene glycol (SYSTANE) 0.4-0.3 % Drop, Administer 1 drop to both eyes every hour as needed., Disp: , Rfl:      potassium chloride SA (K-DUR,KLOR-CON) 20 MEQ tablet, Take 30 mEq by mouth 2 (two) times a day with meals. , Disp: , Rfl:      senna (SENOKOT) 8.6 mg tablet, Take 1 tablet by mouth 2 (two) times a day., Disp: , Rfl:      SOD PHOS,M-B/NA PHOS,DI-BA (FLEET ENEMA RECT), Insert 1 enema into the rectum daily as needed., Disp: , Rfl:      tamsulosin (FLOMAX) 0.4 mg Cp24, Take 0.4 mg by mouth 2 (two) times a day., Disp: , Rfl:      traZODone (DESYREL) 50 MG tablet, Take 100 mg by mouth at bedtime. , Disp: , Rfl:      WARFARIN SODIUM (WARFARIN ORAL), Take by mouth. 2.5mg daily  5mg on Sun, Wed, Sat; 2.5 mg all other days, Disp: , Rfl:      .  Vitals:    06/16/17 1512   BP: 116/74   Pulse: 87   Resp: 21   Temp: 96.9  F (36.1  C)       Physical Exam  Constitutional: No distress.   HENT:   Head: Normocephalic.   Eyes: Pupils are equal, round, and reactive to light.   Neck: Neck supple. No thyromegaly present.   Cardiovascular:   Irregularity. Mitral valve.   Pulmonary/Chest: Breath sounds normal.   Abdominal: Bowel sounds are normal. There is no tenderness. There is no guarding.   Genitourinary: Suprapubic Bullock catheter   Genitourinary Comments:  BPH.   Musculoskeletal:   Bilateral below-knee amputations with prosthetics. Generalized weakness.   Lymphadenopathy:   He has no cervical adenopathy.   Neurological: He is alert.   Skin: Skin is warm and dry. No rash noted.                    LABS:   Results for orders placed or performed in visit on 06/08/17   Basic Metabolic Panel   Result Value Ref Range    Sodium 137 136 - 145 mmol/L    Potassium 4.1 3.5 - 5.0 mmol/L    Chloride 98 98 - 107 mmol/L    CO2 29 22 - 31 mmol/L    Anion Gap, Calculation 10 5 - 18 mmol/L    Glucose 245 (H) 70 - 125 mg/dL    Calcium 8.9 8.5 - 10.5 mg/dL    BUN 43 (H) 8 - 28 mg/dL    Creatinine 1.48 (H) 0.70 - 1.30 mg/dL    GFR MDRD Af Amer 55 (L) >60 mL/min/1.73m2    GFR MDRD Non Af Amer 46 (L) >60 mL/min/1.73m2       Lab Results   Component Value Date    WBC 9.5 06/16/2016    HGB 10.8 (L) 04/25/2017    HCT 34.9 (L) 06/16/2016    MCV 99 06/16/2016     06/16/2016     No results found for: HGBA1C      ASSESSMENT:      ICD-10-CM    1. Urinary retention R33.9    2. Chronic indwelling Bullock catheter Z92.89    3. Diabetes mellitus E11.9    4. Essential hypertension I10        PLAN:    At this time we will continue to manage and follow.  Possible discharge by next week.  A couple more days therapy but overall is pretty pleased with his suprapubic Bullock catheter.  Continue to manage and follow.      Electronically signed by: Michael Duane Johnson, CNP

## 2021-06-11 NOTE — PROGRESS NOTES
[facility]    Facility:   [facility] [[id number]]   Code Status: FULL CODE  PCP: Hima Barba MD   Phone: [phone]   Fax: [phone]      Assessment/Plan:     Maxi has been having long-term issues with BPH and urinary retention with periods of hematuria.  He did fail a voiding trial June 5, 2017.  He did have a repeat visit with urology June 9, 2017 and at that visit they were going to remove the Bullock catheter and do PVRs to which he did fail and his Bullock catheter is back in place and now he has elected to have a suprapubic Bullock catheter.  His Coumadin is on hold.  initiate Lovenox 60 mg.  Rechecking a pro time Nancy 15.   Visit for Preoperative Exam.     Patient approved for surgery with general or local anesthesia. Copy of the pre-op was given to the patient to bring along on the day of surgery. Proceed with proposed surgery without additional clinical clarifications. No Cardiology consultation or non-invasive testing.     Subjective:     Scheduled Procedure: Suprapubic Bullock catheter  Surgery Date: Nancy 15, 2017  Surgery Location: [facility]  Surgeon: Radiology    Current Outpatient Prescriptions   Medication Sig Dispense Refill     acetaminophen (TYLENOL) 650 MG CR tablet Take 650 mg by mouth every 8 (eight) hours as needed for pain.        allopurinol (ZYLOPRIM) 300 MG tablet Take 100 mg by mouth daily.        atorvastatin (LIPITOR) 20 MG tablet Take 20 mg by mouth daily with supper.       bumetanide (BUMEX) 2 MG tablet Take 4 mg by mouth 2 (two) times a day.       carvedilol (COREG) 25 MG tablet Take 12.5 mg by mouth 2 (two) times a day with meals.       clopidogrel (PLAVIX) 75 mg tablet Take 75 mg by mouth daily.       finasteride (PROSCAR) 5 mg tablet Take 5 mg by mouth daily.       insulin aspart (NOVOLOG) 100 unit/mL injection pen 1u per 15g of carbohydrates plus sliding scale.       insulin aspart (NOVOLOG) 100 unit/mL injection pen Inject under  the skin 3 (three) times a day with meals. Per sliding scale: 70 - 149: No corrective insulin, 150 - 199:2 units, 200 - 249:4 units, 250 - 299: 6 units, 300 - 349: 8 units, 350 or greater:10 units and notify MD       isosorbide mononitrate (IMDUR) 60 MG 24 hr tablet Take 60 mg by mouth daily.       levothyroxine (SYNTHROID, LEVOTHROID) 25 MCG tablet Take 50 mcg by mouth Daily before breakfast.        magnesium oxide (MAG-OX) 400 mg tablet Take 400 mg by mouth daily.       miconazole (MICOTIN) 2 % powder Apply 1 application topically 2 (two) times a day.       nitroglycerin (NITROSTAT) 0.4 MG SL tablet Place 0.4 mg under the tongue every 5 (five) minutes as needed.       nystatin, bulk, 10 billion unit Powd Apply 1 application topically 2 (two) times a day as needed. Apply to  bilateral groin until rash resolved       peg 400-propylene glycol (SYSTANE) 0.4-0.3 % Drop Administer 1 drop to both eyes every hour as needed.       potassium chloride SA (K-DUR,KLOR-CON) 20 MEQ tablet Take 30 mEq by mouth 2 (two) times a day with meals.        senna (SENOKOT) 8.6 mg tablet Take 1 tablet by mouth 2 (two) times a day.       SOD PHOS,M-B/NA PHOS,DI-BA (FLEET ENEMA RECT) Insert 1 enema into the rectum daily as needed.       tamsulosin (FLOMAX) 0.4 mg Cp24 Take 0.4 mg by mouth 2 (two) times a day.       traZODone (DESYREL) 50 MG tablet Take 100 mg by mouth at bedtime.        WARFARIN SODIUM (WARFARIN ORAL) Take by mouth. 2.5mg daily   5mg on Sun, Wed, Sat; 2.5 mg all other days       No current facility-administered medications for this visit.        Allergies   Allergen Reactions     Amiodarone Other (See Comments)     Hallucinations     Morphine      Other reaction(s): Hallucinations, Intolerance-Can't Take     Oxycodone-Acetaminophen Other (See Comments)     Hallucinations. Pt tolerates norco     Spironolactone Unknown     Per patient he cannot take but does not recall reaction     Vancomycin Other (See Comments)     Rafael  syndrome occurs when infused too fast.         There is no immunization history on file for this patient.    Patient Active Problem List   Diagnosis     CHF (congestive heart failure)     Essential hypertension     Diabetes mellitus     A-fib     S/P bilateral BKA (below knee amputation)     BPH (benign prostatic hyperplasia)     Hypothyroidism     CKD (chronic kidney disease)     Physical deconditioning     Urinary retention       Past Medical History:   Diagnosis Date     Abnormality of gait      Acute on chronic combined systolic (congestive) and diastolic (congestive) heart failure      Biventricular ICD (implantable cardioverter-defibrillator) in place      BPH (benign prostatic hyperplasia)      CAD (coronary artery disease)      CKD (chronic kidney disease), stage III      Closed nondisplaced fracture of left ischium      Diabetes mellitus      Epigastric pain      Gallstone pancreatitis      GI bleed      Gouty arthropathy      Gross hematuria      Hyperlipidemia      Hypertension      Hyponatremia      Hypothyroidism      Insomnia      Ischemic dilated cardiomyopathy      Mitral insufficiency      Neuropathy, diabetic      Non-rheumatic tricuspid valve insufficiency      Obesity      Paroxysmal atrial fibrillation      Peripheral artery disease      Reaven's syndrome      S/P bilateral below knee amputation      Sleep apnea      Urinary retention        Social History     Social History     Marital status:      Spouse name: N/A     Number of children: N/A     Years of education: N/A     Occupational History     Not on file.     Social History Main Topics     Smoking status: Former Smoker     Packs/day: 2.00     Years: 3.00     Types: Cigarettes     Quit date: 1/1/1957     Smokeless tobacco: Never Used     Alcohol use No     Drug use: No     Sexual activity: Not on file     Other Topics Concern     Not on file     Social History Narrative       Past Surgical History:   Procedure Laterality Date      BELOW KNEE LEG AMPUTATION Left 01/24/2011     BELOW KNEE LEG AMPUTATION Right 07/23/2012     CARDIAC DEFIBRILLATOR PLACEMENT  11/27/2006     CARDIAC PACEMAKER PLACEMENT  09/11/2003     CATARACT EXTRACTION Bilateral      CORONARY ANGIOPLASTY  05/1988     CORONARY ARTERY BYPASS GRAFT  06/28/2000     ESOPHAGOGASTRODUODENOSCOPY  03/10/2009     ICD GENERATOR CHANGE   06/08/2009 1/16/2012     LAPAROSCOPIC CHOLECYSTECTOMY  06/03/2016     LUMBAR LAMINECTOMY  2000     OTHER SURGICAL HISTORY      coronary angiogram x 6       History of Present Illness  Recent Health  Fever: no  Chills: no  Fatigue: no  Chest Pain: no  Cough: no  Dyspnea: no  Urinary Frequency: no, urinary retention, BPH, lino  Nausea: no  Vomiting: no  Diarrhea: no  Abdominal Pain: no  Easy Bruising: no  Lower Extremity Swelling: no  Poor Exercise Tolerance: yes    Most recent Health Maintenance Visit:  1 day(s) ago    Pertinent History  Prior Anesthesia:yes  Previous Anesthesia Reaction:  no  Diabetes: yes  Cardiovascular Disease: yes, afib, HTN, CHF, ischemic dilated cardiomyopathy status post biventricular ICD.  Tricuspid and mitral regurgitation.  Pulmonary Disease: no  Renal Disease: yes, CKD 3.  GI Disease: no  Sleep Apnea: no  Thromboembolic Problems: no  Clotting Disorder: no  Bleeding Disorder: no  Transfusion Reaction: no  Impaired Immunity: no  Steroid use in the last 6 months: no  Frequent Aspirin use: no    Family history of none    Social history of there is no transfusion refusal and there are no concerns regarding care after surgery    After surgery, the patient plans to recover at a rehabilitation center.    Review of Systems  Review of Systems     Currently denies chills fever coughing wheezing chest pain dizziness flulike symptoms rashes sores stiff neck or swollen glands. A history of combined systolic and diastolic congestive heart failure diabetes hypertension A. fib chronic kidney disease hypothyroidism BPH.      Objective:          Vitals:    06/13/17 1359   BP: 116/76   Pulse: 74   Resp: 18   Temp: 96.9  F (36.1  C)   SpO2: 95%   Weight: 218 lb (98.9 kg)       Physical Exam:  Physical Exam    Constitutional: No distress.   HENT:   Head: Normocephalic.   Eyes: Pupils are equal, round, and reactive to light.   Neck: Neck supple. No thyromegaly present.   Cardiovascular:   Irregularity. Mitral valve.   Pulmonary/Chest: Breath sounds normal.   Abdominal: Bowel sounds are normal. There is no tenderness. There is no guarding.   Genitourinary:   Genitourinary Comments: BPH.   Musculoskeletal:   Bilateral below-knee amputations with prosthetics. Generalized weakness.   Lymphadenopathy:   He has no cervical adenopathy.   Neurological: He is alert.   Skin: Skin is warm and dry. No rash noted.                    Results for orders placed or performed in visit on 06/08/17   Basic Metabolic Panel   Result Value Ref Range    Sodium 137 136 - 145 mmol/L    Potassium 4.1 3.5 - 5.0 mmol/L    Chloride 98 98 - 107 mmol/L    CO2 29 22 - 31 mmol/L    Anion Gap, Calculation 10 5 - 18 mmol/L    Glucose 245 (H) 70 - 125 mg/dL    Calcium 8.9 8.5 - 10.5 mg/dL    BUN 43 (H) 8 - 28 mg/dL    Creatinine 1.48 (H) 0.70 - 1.30 mg/dL    GFR MDRD Af Amer 55 (L) >60 mL/min/1.73m2    GFR MDRD Non Af Amer 46 (L) >60 mL/min/1.73m2       No results found for: HGBA1C  Lab Results   Component Value Date    WBC 9.5 06/16/2016    HGB 10.8 (L) 04/25/2017    HCT 34.9 (L) 06/16/2016    MCV 99 06/16/2016     06/16/2016         Electronically signed by: Michael Duane Johnson, CNP

## 2021-06-11 NOTE — PROGRESS NOTES
[facility]    Facility:   [facility] [[id number]]   Code Status: FULL CODE      CHIEF COMPLAINT/REASON FOR VISIT:  Chief Complaint   Patient presents with     Follow Up     urol, labs, rehab       HISTORY:      HPI: Maxi is a 81 y.o. male who I had the pleasure of visiting with again today secondary to his chronic medical conditions.  His blood sugars have been ranging 1 .  Only on sliding scale.  He does see urology today for his BPH and urinary retention.  He is to have a Bullock they took it out but he still needs to be straight cath.  He actually at one point was thinking about a suprapubic Bullock catheter.  He did have a BMP ordered on June 8 a creatinine did pop up to 1.48 and he will try to improve with his fluids.  Otherwise we will look at his Bumex.  Last covered date is next week.  His slums scale actually became within normal limits.    Past Medical History:   Diagnosis Date     Abnormality of gait      Acute on chronic combined systolic (congestive) and diastolic (congestive) heart failure      Biventricular ICD (implantable cardioverter-defibrillator) in place      BPH (benign prostatic hyperplasia)      CAD (coronary artery disease)      CKD (chronic kidney disease), stage III      Closed nondisplaced fracture of left ischium      Diabetes mellitus      Epigastric pain      Gallstone pancreatitis      GI bleed      Gouty arthropathy      Gross hematuria      Hyperlipidemia      Hypertension      Hyponatremia      Hypothyroidism      Insomnia      Ischemic dilated cardiomyopathy      Mitral insufficiency      Neuropathy, diabetic      Non-rheumatic tricuspid valve insufficiency      Obesity      Paroxysmal atrial fibrillation      Peripheral artery disease      Reaven's syndrome      S/P bilateral below knee amputation      Sleep apnea      Urinary retention              Family History   Problem Relation Age of Onset     Other Other      there is a positive  family hx of coronary disease     Social History     Social History     Marital status:      Spouse name: N/A     Number of children: N/A     Years of education: N/A     Social History Main Topics     Smoking status: Former Smoker     Packs/day: 2.00     Years: 3.00     Types: Cigarettes     Quit date: 1/1/1957     Smokeless tobacco: Never Used     Alcohol use No     Drug use: No     Sexual activity: Not on file     Other Topics Concern     Not on file     Social History Narrative         Review of Systems  Currently denies chills fever coughing wheezing chest pain dizziness flulike symptoms rashes sores stiff neck or swollen glands. A history of combined systolic and diastolic congestive heart failure diabetes hypertension A. fib chronic kidney disease hypothyroidism BPH.      Current Outpatient Prescriptions:      acetaminophen (TYLENOL) 650 MG CR tablet, Take 650 mg by mouth every 8 (eight) hours as needed for pain. , Disp: , Rfl:      allopurinol (ZYLOPRIM) 300 MG tablet, Take 100 mg by mouth daily. , Disp: , Rfl:      atorvastatin (LIPITOR) 20 MG tablet, Take 20 mg by mouth daily with supper., Disp: , Rfl:      bumetanide (BUMEX) 2 MG tablet, Take 4 mg by mouth 2 (two) times a day., Disp: , Rfl:      carvedilol (COREG) 25 MG tablet, Take 12.5 mg by mouth 2 (two) times a day with meals., Disp: , Rfl:      clopidogrel (PLAVIX) 75 mg tablet, Take 75 mg by mouth daily., Disp: , Rfl:      finasteride (PROSCAR) 5 mg tablet, Take 5 mg by mouth daily., Disp: , Rfl:      insulin aspart (NOVOLOG) 100 unit/mL injection pen, 1u per 15g of carbohydrates plus sliding scale., Disp: , Rfl:      insulin aspart (NOVOLOG) 100 unit/mL injection pen, Inject under the skin 3 (three) times a day with meals. Per sliding scale: 70 - 149: No corrective insulin, 150 - 199:2 units, 200 - 249:4 units, 250 - 299: 6 units, 300 - 349: 8 units, 350 or greater:10 units and notify MD, Disp: , Rfl:      isosorbide mononitrate (IMDUR) 60 MG  24 hr tablet, Take 60 mg by mouth daily., Disp: , Rfl:      levothyroxine (SYNTHROID, LEVOTHROID) 25 MCG tablet, Take 50 mcg by mouth Daily before breakfast. , Disp: , Rfl:      magnesium oxide (MAG-OX) 400 mg tablet, Take 400 mg by mouth daily., Disp: , Rfl:      miconazole (MICOTIN) 2 % powder, Apply 1 application topically 2 (two) times a day., Disp: , Rfl:      nitroglycerin (NITROSTAT) 0.4 MG SL tablet, Place 0.4 mg under the tongue every 5 (five) minutes as needed., Disp: , Rfl:      nystatin, bulk, 10 billion unit Powd, Apply 1 application topically 2 (two) times a day as needed. Apply to  bilateral groin until rash resolved, Disp: , Rfl:      peg 400-propylene glycol (SYSTANE) 0.4-0.3 % Drop, Administer 1 drop to both eyes every hour as needed., Disp: , Rfl:      potassium chloride SA (K-DUR,KLOR-CON) 20 MEQ tablet, Take 30 mEq by mouth 2 (two) times a day with meals. , Disp: , Rfl:      senna (SENOKOT) 8.6 mg tablet, Take 1 tablet by mouth 2 (two) times a day., Disp: , Rfl:      SOD PHOS,M-B/NA PHOS,DI-BA (FLEET ENEMA RECT), Insert 1 enema into the rectum daily as needed., Disp: , Rfl:      tamsulosin (FLOMAX) 0.4 mg Cp24, Take 0.4 mg by mouth 2 (two) times a day., Disp: , Rfl:      traZODone (DESYREL) 50 MG tablet, Take 100 mg by mouth at bedtime. , Disp: , Rfl:      WARFARIN SODIUM (WARFARIN ORAL), Take by mouth. 2.5mg daily  5mg on Sun, Wed, Sat; 2.5 mg all other days, Disp: , Rfl:     .  Vitals:    06/09/17 1530   BP: 133/75   Pulse: 97   Resp: 19   Temp: 98.1  F (36.7  C)       Physical Exam  Constitutional: No distress.   HENT:   Head: Normocephalic.   Eyes: Pupils are equal, round, and reactive to light.   Neck: Neck supple. No thyromegaly present.   Cardiovascular:   Irregularity. Mitral valve.   Pulmonary/Chest: Breath sounds normal.   Abdominal: Bowel sounds are normal. There is no tenderness. There is no guarding.   Genitourinary:   Genitourinary Comments: BPH.   Musculoskeletal:   Bilateral  below-knee amputations with prosthetics. Generalized weakness.   Lymphadenopathy:   He has no cervical adenopathy.   Neurological: He is alert.   Skin: Skin is warm and dry. No rash noted.                      LABS:   Results for orders placed or performed in visit on 06/08/17   Basic Metabolic Panel   Result Value Ref Range    Sodium 137 136 - 145 mmol/L    Potassium 4.1 3.5 - 5.0 mmol/L    Chloride 98 98 - 107 mmol/L    CO2 29 22 - 31 mmol/L    Anion Gap, Calculation 10 5 - 18 mmol/L    Glucose 245 (H) 70 - 125 mg/dL    Calcium 8.9 8.5 - 10.5 mg/dL    BUN 43 (H) 8 - 28 mg/dL    Creatinine 1.48 (H) 0.70 - 1.30 mg/dL    GFR MDRD Af Amer 55 (L) >60 mL/min/1.73m2    GFR MDRD Non Af Amer 46 (L) >60 mL/min/1.73m2       Lab Results   Component Value Date    WBC 9.5 06/16/2016    HGB 10.8 (L) 04/25/2017    HCT 34.9 (L) 06/16/2016    MCV 99 06/16/2016     06/16/2016     No results found for: HGBA1C      ASSESSMENT:      ICD-10-CM    1. Urinary retention R33.9    2. A-fib I48.91    3. Diabetes mellitus E11.9    4. BPH (benign prostatic hyperplasia) N40.0        PLAN:    He will try to encourage fluids and keep himself hydrated otherwise we will look at the diuretics.  His slums did improve we does see urology today curious what might occur and transpire from that visit.  Possible discharge next week.    .    Electronically signed by: Michael Duane Johnson, CNP

## 2021-06-11 NOTE — PROGRESS NOTES
[facility]    Facility:   [facility] [[id number]]   Code Status: FULL CODE      CHIEF COMPLAINT/REASON FOR VISIT:  Chief Complaint   Patient presents with     Follow Up     [place], urol, rehab       HISTORY:      HPI: Maxi is a 81 y.o. male who I had a chance to revisit with secondary to his chronic medical conditions.  He has been on the transitional care unit since May.  He has been normotensive and afebrile and also on room air.  Blood sugars ranging 163/212 on sliding scale.  Does not want to take any long-acting at this time.  His highest was 230.  Her sleep he is on trazodone.  Does have congestive failure is on Bumex 2 mg twice daily along with potassium 30 mEq twice daily he is due for a BMP as last was May 18.  He is on finasteride and Flomax secondary to urinary retention and BPH.  Did see the urologist on June 5.  Follow-up Nancy 15.  Took out the Bullock catheter been trying to do voiding trials.  Not terribly successful they are cathing him per the urologist request.  Otherwise he does have bilateral prostatic legs.  He feels like his therapy has been normalized.  He is having a care conference today.  Hoping to go home soon.  Is on Coumadin.  He does have urinary retention.  Did see the urologist this week.  Follow-up next week.  They are doing some straight catheterizations on him since he is unable to completely eliminate the urine.    Past Medical History:   Diagnosis Date     Abnormality of gait      Acute on chronic combined systolic (congestive) and diastolic (congestive) heart failure      Biventricular ICD (implantable cardioverter-defibrillator) in place      BPH (benign prostatic hyperplasia)      CAD (coronary artery disease)      CKD (chronic kidney disease), stage III      Closed nondisplaced fracture of left ischium      Diabetes mellitus      Epigastric pain      Gallstone pancreatitis      GI bleed      Gouty arthropathy      Gross hematuria       Hyperlipidemia      Hypertension      Hyponatremia      Hypothyroidism      Insomnia      Ischemic dilated cardiomyopathy      Mitral insufficiency      Neuropathy, diabetic      Non-rheumatic tricuspid valve insufficiency      Obesity      Paroxysmal atrial fibrillation      Peripheral artery disease      Reaven's syndrome      S/P bilateral below knee amputation      Sleep apnea      Urinary retention              Family History   Problem Relation Age of Onset     Other Other      there is a positive family hx of coronary disease     Social History     Social History     Marital status:      Spouse name: N/A     Number of children: N/A     Years of education: N/A     Social History Main Topics     Smoking status: Former Smoker     Packs/day: 2.00     Years: 3.00     Types: Cigarettes     Quit date: 1/1/1957     Smokeless tobacco: Never Used     Alcohol use No     Drug use: No     Sexual activity: Not on file     Other Topics Concern     Not on file     Social History Narrative         Review of Systems  Currently denies chills fever coughing wheezing chest pain dizziness flulike symptoms rashes sores stiff neck or swollen glands. A history of combined systolic and diastolic congestive heart failure diabetes hypertension A. fib chronic kidney disease hypothyroidism BPH.    Current Outpatient Prescriptions   Medication Sig     acetaminophen (TYLENOL) 650 MG CR tablet Take 650 mg by mouth every 8 (eight) hours as needed for pain.      allopurinol (ZYLOPRIM) 300 MG tablet Take 100 mg by mouth daily.      atorvastatin (LIPITOR) 20 MG tablet Take 20 mg by mouth daily with supper.     bumetanide (BUMEX) 2 MG tablet Take 4 mg by mouth 2 (two) times a day.     carvedilol (COREG) 25 MG tablet Take 12.5 mg by mouth 2 (two) times a day with meals.     clopidogrel (PLAVIX) 75 mg tablet Take 75 mg by mouth daily.     finasteride (PROSCAR) 5 mg tablet Take 5 mg by mouth daily.     insulin aspart (NOVOLOG) 100 unit/mL  injection pen 1u per 15g of carbohydrates plus sliding scale.     insulin aspart (NOVOLOG) 100 unit/mL injection pen Inject under the skin 3 (three) times a day with meals. Per sliding scale: 70 - 149: No corrective insulin, 150 - 199:2 units, 200 - 249:4 units, 250 - 299: 6 units, 300 - 349: 8 units, 350 or greater:10 units and notify MD     isosorbide mononitrate (IMDUR) 60 MG 24 hr tablet Take 60 mg by mouth daily.     levothyroxine (SYNTHROID, LEVOTHROID) 25 MCG tablet Take 50 mcg by mouth Daily before breakfast.      magnesium oxide (MAG-OX) 400 mg tablet Take 400 mg by mouth daily.     miconazole (MICOTIN) 2 % powder Apply 1 application topically 2 (two) times a day.     nitroglycerin (NITROSTAT) 0.4 MG SL tablet Place 0.4 mg under the tongue every 5 (five) minutes as needed.     nystatin, bulk, 10 billion unit Powd Apply 1 application topically 2 (two) times a day as needed. Apply to  bilateral groin until rash resolved     peg 400-propylene glycol (SYSTANE) 0.4-0.3 % Drop Administer 1 drop to both eyes every hour as needed.     potassium chloride SA (K-DUR,KLOR-CON) 20 MEQ tablet Take 30 mEq by mouth 2 (two) times a day with meals.      senna (SENOKOT) 8.6 mg tablet Take 1 tablet by mouth 2 (two) times a day.     SOD PHOS,M-B/NA PHOS,DI-BA (FLEET ENEMA RECT) Insert 1 enema into the rectum daily as needed.     tamsulosin (FLOMAX) 0.4 mg Cp24 Take 0.4 mg by mouth 2 (two) times a day.     traZODone (DESYREL) 50 MG tablet Take 100 mg by mouth at bedtime.      WARFARIN SODIUM (WARFARIN ORAL) Take by mouth. 2.5mg daily   5mg on Sun, Wed, Sat; 2.5 mg all other days       .  Vitals:    06/06/17 1247   BP: 107/53   Pulse: 74   Resp: 22   Temp: 97.4  F (36.3  C)       Physical Exam   Constitutional: No distress.   HENT:   Head: Normocephalic.   Eyes: Pupils are equal, round, and reactive to light.   Neck: Neck supple. No thyromegaly present.   Cardiovascular:   Irregularity. Mitral valve.   Pulmonary/Chest: Breath  sounds normal.   Abdominal: Bowel sounds are normal. There is no tenderness. There is no guarding.   Genitourinary:   Genitourinary Comments: BPH.   Musculoskeletal:   Bilateral below-knee amputations with prosthetics. Generalized weakness.   Lymphadenopathy:   He has no cervical adenopathy.   Neurological: He is alert.   Skin: Skin is warm and dry. No rash noted.                  LABS:   Results for orders placed or performed in visit on 05/18/17   Basic Metabolic Panel   Result Value Ref Range    Sodium 137 136 - 145 mmol/L    Potassium 4.2 3.5 - 5.0 mmol/L    Chloride 96 (L) 98 - 107 mmol/L    CO2 31 22 - 31 mmol/L    Anion Gap, Calculation 10 5 - 18 mmol/L    Glucose 169 (H) 70 - 125 mg/dL    Calcium 8.8 8.5 - 10.5 mg/dL    BUN 50 (H) 8 - 28 mg/dL    Creatinine 1.22 0.70 - 1.30 mg/dL    GFR MDRD Af Amer >60 >60 mL/min/1.73m2    GFR MDRD Non Af Amer 57 (L) >60 mL/min/1.73m2       Lab Results   Component Value Date    WBC 9.5 06/16/2016    HGB 10.8 (L) 04/25/2017    HCT 34.9 (L) 06/16/2016    MCV 99 06/16/2016     06/16/2016     No results found for: HGBA1C      ASSESSMENT:      ICD-10-CM    1. Urinary retention R33.9    2. BPH (benign prostatic hyperplasia) N40.0    3. Diabetes mellitus E11.9    4. Essential hypertension I10    5. General weakness R53.1        PLAN:    We will repeat the BMP on June 8.  Otherwise continue to monitor his Bullock catheter or his straight cast in his urinary issues.  He agrees with the current game plan and he will follow-up with urology next week.        Electronically signed by: Michael Duane Johnson, CNP

## 2021-06-11 NOTE — PROGRESS NOTES
[facility]    Facility:   [facility] [[id number]]   Code Status: FULL CODE      CHIEF COMPLAINT/REASON FOR VISIT:  Chief Complaint   Patient presents with     Follow Up     jennifer lino,       HISTORY:      HPI: Maxi is a 81 y.o. male who I had the pleasure to visit with secondary to his most recent suprapubic Lino catheter placement along with generalized debility deconditioning heart failure and rehabilitation.  Blood sugars ranging 132-200.  His blood pressures actually been a little on the lower side will now decrease his Coreg from 12.5 mg twice daily and now will be 6.25 mg twice daily.  He is on Coumadin 3 mg rechecking again on June 27.  His suprapubic Lino catheter is healing up quite nicely with no infection.  Does take trazodone for sleep.  He otherwise is making pretty good progress also participating in therapy.  He is hoping to be discharged by next week.    Past Medical History:   Diagnosis Date     Abnormality of gait      Acute on chronic combined systolic (congestive) and diastolic (congestive) heart failure      Biventricular ICD (implantable cardioverter-defibrillator) in place      BPH (benign prostatic hyperplasia)      CAD (coronary artery disease)      CKD (chronic kidney disease), stage III      Closed nondisplaced fracture of left ischium      Diabetes mellitus      Epigastric pain      Gallstone pancreatitis      GI bleed      Gouty arthropathy      Gross hematuria      Hyperlipidemia      Hypertension      Hyponatremia      Hypothyroidism      Insomnia      Ischemic dilated cardiomyopathy      Mitral insufficiency      Neuropathy, diabetic      Non-rheumatic tricuspid valve insufficiency      Obesity      Paroxysmal atrial fibrillation      Peripheral artery disease      Reaven's syndrome      S/P bilateral below knee amputation      Sleep apnea      Urinary retention              Family History   Problem Relation Age of Onset     Other Other       there is a positive family hx of coronary disease     Social History     Social History     Marital status:      Spouse name: N/A     Number of children: N/A     Years of education: N/A     Social History Main Topics     Smoking status: Former Smoker     Packs/day: 2.00     Years: 3.00     Types: Cigarettes     Quit date: 1/1/1957     Smokeless tobacco: Never Used     Alcohol use No     Drug use: No     Sexual activity: Not on file     Other Topics Concern     Not on file     Social History Narrative         Review of Systems  Currently denies chills fever coughing wheezing chest pain dizziness flulike symptoms rashes sores stiff neck or swollen glands. A history of combined systolic and diastolic congestive heart failure diabetes hypertension A. fib chronic kidney disease hypothyroidism BPH.          Current Outpatient Prescriptions:      acetaminophen (TYLENOL) 650 MG CR tablet, Take 650 mg by mouth every 8 (eight) hours as needed for pain. , Disp: , Rfl:      allopurinol (ZYLOPRIM) 300 MG tablet, Take 100 mg by mouth daily. , Disp: , Rfl:      atorvastatin (LIPITOR) 20 MG tablet, Take 20 mg by mouth daily with supper., Disp: , Rfl:      bumetanide (BUMEX) 2 MG tablet, Take 4 mg by mouth 2 (two) times a day., Disp: , Rfl:      carvedilol (COREG) 25 MG tablet, Take 6.25 mg by mouth 2 (two) times a day with meals. , Disp: , Rfl:      clopidogrel (PLAVIX) 75 mg tablet, Take 75 mg by mouth daily., Disp: , Rfl:      finasteride (PROSCAR) 5 mg tablet, Take 5 mg by mouth daily., Disp: , Rfl:      insulin aspart (NOVOLOG) 100 unit/mL injection pen, 1u per 15g of carbohydrates plus sliding scale., Disp: , Rfl:      insulin aspart (NOVOLOG) 100 unit/mL injection pen, Inject under the skin 3 (three) times a day with meals. Per sliding scale: 70 - 149: No corrective insulin, 150 - 199:2 units, 200 - 249:4 units, 250 - 299: 6 units, 300 - 349: 8 units, 350 or greater:10 units and notify MD, Disp: , Rfl:       isosorbide mononitrate (IMDUR) 60 MG 24 hr tablet, Take 60 mg by mouth daily., Disp: , Rfl:      levothyroxine (SYNTHROID, LEVOTHROID) 25 MCG tablet, Take 50 mcg by mouth Daily before breakfast. , Disp: , Rfl:      magnesium oxide (MAG-OX) 400 mg tablet, Take 400 mg by mouth daily., Disp: , Rfl:      miconazole (MICOTIN) 2 % powder, Apply 1 application topically 2 (two) times a day., Disp: , Rfl:      nitroglycerin (NITROSTAT) 0.4 MG SL tablet, Place 0.4 mg under the tongue every 5 (five) minutes as needed., Disp: , Rfl:      nystatin, bulk, 10 billion unit Powd, Apply 1 application topically 2 (two) times a day as needed. Apply to  bilateral groin until rash resolved, Disp: , Rfl:      peg 400-propylene glycol (SYSTANE) 0.4-0.3 % Drop, Administer 1 drop to both eyes every hour as needed., Disp: , Rfl:      potassium chloride SA (K-DUR,KLOR-CON) 20 MEQ tablet, Take 30 mEq by mouth 2 (two) times a day with meals. , Disp: , Rfl:      senna (SENOKOT) 8.6 mg tablet, Take 1 tablet by mouth 2 (two) times a day., Disp: , Rfl:      SOD PHOS,M-B/NA PHOS,DI-BA (FLEET ENEMA RECT), Insert 1 enema into the rectum daily as needed., Disp: , Rfl:      tamsulosin (FLOMAX) 0.4 mg Cp24, Take 0.4 mg by mouth 2 (two) times a day., Disp: , Rfl:      traZODone (DESYREL) 50 MG tablet, Take 100 mg by mouth at bedtime. , Disp: , Rfl:      WARFARIN SODIUM (WARFARIN ORAL), Take by mouth. 2.5mg daily  5mg on Sun, Wed, Sat; 2.5 mg all other days, Disp: , Rfl:     .  Vitals:    06/20/17 1341   BP: 103/70   Pulse: 68   Resp: 18   Temp: 96.7  F (35.9  C)       Physical Exam   Constitutional: No distress.   HENT:   Head: Normocephalic.   Eyes: Pupils are equal, round, and reactive to light.   Neck: Neck supple. No thyromegaly present.   Cardiovascular:   Irregularity. Mitral valve.   Pulmonary/Chest: Breath sounds normal.   Abdominal: Bowel sounds are normal. There is no tenderness. There is no guarding.   Genitourinary: Suprapubic Bullock catheter    Genitourinary Comments: BPH.   Musculoskeletal:   Bilateral below-knee amputations with prosthetics. Generalized weakness.   Lymphadenopathy:   He has no cervical adenopathy.                        LABS:   Results for orders placed or performed in visit on 06/08/17   Basic Metabolic Panel   Result Value Ref Range    Sodium 137 136 - 145 mmol/L    Potassium 4.1 3.5 - 5.0 mmol/L    Chloride 98 98 - 107 mmol/L    CO2 29 22 - 31 mmol/L    Anion Gap, Calculation 10 5 - 18 mmol/L    Glucose 245 (H) 70 - 125 mg/dL    Calcium 8.9 8.5 - 10.5 mg/dL    BUN 43 (H) 8 - 28 mg/dL    Creatinine 1.48 (H) 0.70 - 1.30 mg/dL    GFR MDRD Af Amer 55 (L) >60 mL/min/1.73m2    GFR MDRD Non Af Amer 46 (L) >60 mL/min/1.73m2       Lab Results   Component Value Date    WBC 9.5 06/16/2016    HGB 10.8 (L) 04/25/2017    HCT 34.9 (L) 06/16/2016    MCV 99 06/16/2016     06/16/2016     No results found for: HGBA1C      ASSESSMENT:      ICD-10-CM    1. Chronic indwelling Bullock catheter Z92.89    2. Essential hypertension I10    3. Diabetes mellitus E11.9    4. Physical deconditioning R53.81        PLAN:    Everything seems to be working well at this time.  Will decrease the Coreg 6.25 mg twice daily.  The Coumadin still at 3 mg.  The suprapubic Bullock catheter healing nicely.  Hopefully a discharge by the weekend.        Electronically signed by: Michael Duane Johnson, CNP

## 2021-06-11 NOTE — PROGRESS NOTES
[facility]    Facility:   [facility] [[id number]]   Code Status: FULL CODE      CHIEF COMPLAINT/REASON FOR VISIT:  Chief Complaint   Patient presents with     Follow Up     rehab       HISTORY:      HPI: Maxi is a 81 y.o. male who I had a chance to revisit with today secondary to his chronic medical conditions including his urinary retention BPH and hematuria which he was most recently admitted for in the hospital.  No hematuria.  Does have a Bullock catheter.  He does see urology next week.  He is on Coumadin will readjust an alternate 3 and 2.5 mg.  His blood sugars ranging 140-250.  He wants to stay on sliding scale insulin.  He is looking to get a home evaluation.  Therapy is checking on that but they do not think that will be able to do a home evaluation but he has improved with his strength and conditioning he does have bilateral amputations he does have prosthesis.  They are working on some car transfers as well today.  Not having any pain.  Has been normotensive and afebrile.    Past Medical History:   Diagnosis Date     Abnormality of gait      Acute on chronic combined systolic (congestive) and diastolic (congestive) heart failure      Biventricular ICD (implantable cardioverter-defibrillator) in place      BPH (benign prostatic hyperplasia)      CAD (coronary artery disease)      CKD (chronic kidney disease), stage III      Closed nondisplaced fracture of left ischium      Diabetes mellitus      Epigastric pain      Gallstone pancreatitis      GI bleed      Gouty arthropathy      Gross hematuria      Hyperlipidemia      Hypertension      Hyponatremia      Hypothyroidism      Insomnia      Ischemic dilated cardiomyopathy      Mitral insufficiency      Neuropathy, diabetic      Non-rheumatic tricuspid valve insufficiency      Obesity      Paroxysmal atrial fibrillation      Peripheral artery disease      Reaven's syndrome      S/P bilateral below knee amputation       Sleep apnea      Urinary retention              Family History   Problem Relation Age of Onset     Other Other      there is a positive family hx of coronary disease     Social History     Social History     Marital status:      Spouse name: N/A     Number of children: N/A     Years of education: N/A     Social History Main Topics     Smoking status: Former Smoker     Packs/day: 2.00     Years: 3.00     Types: Cigarettes     Quit date: 1/1/1957     Smokeless tobacco: Never Used     Alcohol use No     Drug use: No     Sexual activity: Not on file     Other Topics Concern     Not on file     Social History Narrative         Review of Systems  Currently denies chills fever coughing wheezing chest pain dizziness flulike symptoms rashes sores stiff neck or swollen glands. A history of combined systolic and diastolic congestive heart failure diabetes hypertension A. fib chronic kidney disease hypothyroidism BPH.      Current Outpatient Prescriptions:      acetaminophen (TYLENOL) 650 MG CR tablet, Take 650 mg by mouth every 8 (eight) hours as needed for pain. , Disp: , Rfl:      allopurinol (ZYLOPRIM) 300 MG tablet, Take 100 mg by mouth daily. , Disp: , Rfl:      atorvastatin (LIPITOR) 20 MG tablet, Take 20 mg by mouth daily with supper., Disp: , Rfl:      bumetanide (BUMEX) 2 MG tablet, Take 4 mg by mouth 2 (two) times a day., Disp: , Rfl:      carvedilol (COREG) 25 MG tablet, Take 12.5 mg by mouth 2 (two) times a day with meals., Disp: , Rfl:      clopidogrel (PLAVIX) 75 mg tablet, Take 75 mg by mouth daily., Disp: , Rfl:      finasteride (PROSCAR) 5 mg tablet, Take 5 mg by mouth daily., Disp: , Rfl:      insulin aspart (NOVOLOG) 100 unit/mL injection pen, 1u per 15g of carbohydrates plus sliding scale., Disp: , Rfl:      insulin aspart (NOVOLOG) 100 unit/mL injection pen, Inject under the skin 3 (three) times a day with meals. Per sliding scale: 70 - 149: No corrective insulin, 150 - 199:2 units, 200 - 249:4  units, 250 - 299: 6 units, 300 - 349: 8 units, 350 or greater:10 units and notify MD, Disp: , Rfl:      isosorbide mononitrate (IMDUR) 60 MG 24 hr tablet, Take 60 mg by mouth daily., Disp: , Rfl:      levothyroxine (SYNTHROID, LEVOTHROID) 25 MCG tablet, Take 50 mcg by mouth Daily before breakfast. , Disp: , Rfl:      magnesium oxide (MAG-OX) 400 mg tablet, Take 400 mg by mouth daily., Disp: , Rfl:      miconazole (MICOTIN) 2 % powder, Apply 1 application topically 2 (two) times a day., Disp: , Rfl:      nitroglycerin (NITROSTAT) 0.4 MG SL tablet, Place 0.4 mg under the tongue every 5 (five) minutes as needed., Disp: , Rfl:      nystatin, bulk, 10 billion unit Powd, Apply 1 application topically 2 (two) times a day as needed. Apply to  bilateral groin until rash resolved, Disp: , Rfl:      peg 400-propylene glycol (SYSTANE) 0.4-0.3 % Drop, Administer 1 drop to both eyes every hour as needed., Disp: , Rfl:      potassium chloride SA (K-DUR,KLOR-CON) 20 MEQ tablet, Take 30 mEq by mouth 2 (two) times a day with meals. , Disp: , Rfl:      senna (SENOKOT) 8.6 mg tablet, Take 1 tablet by mouth 2 (two) times a day., Disp: , Rfl:      SOD PHOS,M-B/NA PHOS,DI-BA (FLEET ENEMA RECT), Insert 1 enema into the rectum daily as needed., Disp: , Rfl:      tamsulosin (FLOMAX) 0.4 mg Cp24, Take 0.4 mg by mouth 2 (two) times a day., Disp: , Rfl:      traZODone (DESYREL) 50 MG tablet, Take 100 mg by mouth at bedtime. , Disp: , Rfl:      WARFARIN SODIUM (WARFARIN ORAL), Take by mouth. 2.5mg daily  5mg on Sun, Wed, Sat; 2.5 mg all other days, Disp: , Rfl:     .  Blood pressure 109/64 pulse 80 respirations 20 temperature 97.4.    Physical Exam   Constitutional: No distress.   HENT:   Head: Normocephalic.   Eyes: Pupils are equal, round, and reactive to light.   Neck: Neck supple. No thyromegaly present.   Cardiovascular:   Irregularity. Mitral valve.   Pulmonary/Chest: Breath sounds normal.   Abdominal: Bowel sounds are normal. There is no  tenderness. There is no guarding.   Genitourinary:   Genitourinary Comments: BPH. lino  Musculoskeletal:   Bilateral below-knee amputations with prosthetics. Generalized weakness.   Lymphadenopathy:   He has no cervical adenopathy.   Neurological: He is alert.   Skin: Skin is warm and dry. No rash noted.                LABS:   Lab Results   Component Value Date    WBC 9.5 06/16/2016    HGB 10.8 (L) 04/25/2017    HCT 34.9 (L) 06/16/2016    MCV 99 06/16/2016     06/16/2016     Results for orders placed or performed in visit on 05/18/17   Basic Metabolic Panel   Result Value Ref Range    Sodium 137 136 - 145 mmol/L    Potassium 4.2 3.5 - 5.0 mmol/L    Chloride 96 (L) 98 - 107 mmol/L    CO2 31 22 - 31 mmol/L    Anion Gap, Calculation 10 5 - 18 mmol/L    Glucose 169 (H) 70 - 125 mg/dL    Calcium 8.8 8.5 - 10.5 mg/dL    BUN 50 (H) 8 - 28 mg/dL    Creatinine 1.22 0.70 - 1.30 mg/dL    GFR MDRD Af Amer >60 >60 mL/min/1.73m2    GFR MDRD Non Af Amer 57 (L) >60 mL/min/1.73m2           ASSESSMENT:      ICD-10-CM    1. Urinary retention R33.9    2. BPH (benign prostatic hyperplasia) N40.0    3. S/P bilateral BKA (below knee amputation) Z89.512     Z89.511    4. Diabetes mellitus E11.9        PLAN:    He will be seeing urology next week.  We will alternate the Coumadin 3 mg and 2.5 mg.  A looks like a possible discharge by next week.  They are working on some car transfers.      Electronically signed by: Michael Duane Johnson, CNP

## 2021-06-11 NOTE — PROGRESS NOTES
[facility]    Facility:   [facility] [[id number]]   Code Status: FULL CODE      CHIEF COMPLAINT/REASON FOR VISIT:  Chief Complaint   Patient presents with     Follow Up     reha       HISTORY:      HPI: Maxi is a 81 y.o. male who I had the pleasure of visiting with again today secondary to his multiple chronic medical conditions including most recently a suprapubic Bullock catheter placement due to BPH and chronic indwelling Bullock catheter.  Most recently decreased his Coreg 6.25 mg twice daily his blood pressures actually did improve he was actually having some hypotension episodes.  He also is on Coumadin rechecking a pro time next week on the 27th.  He does have a suprapubic Bullock catheter.  No problems no concerns working well.  Blood sugars 1 .  Appetite good.  Sensing humor good.  Possible discharge in a week or so.    Past Medical History:   Diagnosis Date     Abnormality of gait      Acute on chronic combined systolic (congestive) and diastolic (congestive) heart failure      Biventricular ICD (implantable cardioverter-defibrillator) in place      BPH (benign prostatic hyperplasia)      CAD (coronary artery disease)      CKD (chronic kidney disease), stage III      Closed nondisplaced fracture of left ischium      Diabetes mellitus      Epigastric pain      Gallstone pancreatitis      GI bleed      Gouty arthropathy      Gross hematuria      Hyperlipidemia      Hypertension      Hyponatremia      Hypothyroidism      Insomnia      Ischemic dilated cardiomyopathy      Mitral insufficiency      Neuropathy, diabetic      Non-rheumatic tricuspid valve insufficiency      Obesity      Paroxysmal atrial fibrillation      Peripheral artery disease      Reaven's syndrome      S/P bilateral below knee amputation      Sleep apnea      Urinary retention              Family History   Problem Relation Age of Onset     Other Other      there is a positive family hx of coronary  disease     Social History     Social History     Marital status:      Spouse name: N/A     Number of children: N/A     Years of education: N/A     Social History Main Topics     Smoking status: Former Smoker     Packs/day: 2.00     Years: 3.00     Types: Cigarettes     Quit date: 1/1/1957     Smokeless tobacco: Never Used     Alcohol use No     Drug use: No     Sexual activity: Not on file     Other Topics Concern     Not on file     Social History Narrative         Review of Systems  Currently denies chills fever coughing wheezing chest pain dizziness flulike symptoms rashes sores stiff neck or swollen glands. A history of combined systolic and diastolic congestive heart failure diabetes hypertension A. fib chronic kidney disease hypothyroidism BPH.  .There were no vitals filed for this visit.  Blood pressure 129/47 pulse 88 respirations 20 temperature 97.5  Physical Exam   Constitutional: No distress.   HENT:   Head: Normocephalic.   Eyes: Pupils are equal, round, and reactive to light.   Neck: Neck supple. No thyromegaly present.   Cardiovascular:   Irregularity. Mitral valve.   Pulmonary/Chest: Breath sounds normal.   Abdominal: Bowel sounds are normal. There is no tenderness. There is no guarding.   Genitourinary: Suprapubic Bullock catheter   Genitourinary Comments: BPH.   Musculoskeletal:   Bilateral below-knee amputations with prosthetics. Generalized weakness.   Lymphadenopathy:   He has no cervical adenopathy.     LABS:   No results found for: HGBA1C  Lab Results   Component Value Date    WBC 9.5 06/16/2016    HGB 10.8 (L) 04/25/2017    HCT 34.9 (L) 06/16/2016    MCV 99 06/16/2016     06/16/2016         ASSESSMENT:      ICD-10-CM    1. Diabetes mellitus E11.9    2. CHF (congestive heart failure) I50.9    3. Physical deconditioning R53.81    4. Chronic indwelling Bullock catheter Z92.89        PLAN:    No other changes at this time.  Continue to work with therapy.  He feels that he believes that  he is to be able to be going home by next week some time.    Electronically signed by: Michael Duane Johnson, CNP

## 2021-06-15 PROBLEM — E11.9 DIABETES MELLITUS (H): Status: ACTIVE | Noted: 2017-04-14

## 2021-06-15 PROBLEM — I50.9 CHF (CONGESTIVE HEART FAILURE) (H): Status: ACTIVE | Noted: 2017-04-14

## 2021-06-15 PROBLEM — E03.9 HYPOTHYROIDISM: Status: ACTIVE | Noted: 2017-04-14

## 2021-06-15 PROBLEM — N18.9 CKD (CHRONIC KIDNEY DISEASE): Status: ACTIVE | Noted: 2017-04-18

## 2021-06-15 PROBLEM — Z89.511 S/P BILATERAL BKA (BELOW KNEE AMPUTATION) (H): Status: ACTIVE | Noted: 2017-04-14

## 2021-06-15 PROBLEM — N40.0 BPH (BENIGN PROSTATIC HYPERPLASIA): Status: ACTIVE | Noted: 2017-04-14

## 2021-06-15 PROBLEM — I48.91 A-FIB (H): Status: ACTIVE | Noted: 2017-04-14

## 2021-06-15 PROBLEM — Z89.512 S/P BILATERAL BKA (BELOW KNEE AMPUTATION) (H): Status: ACTIVE | Noted: 2017-04-14

## 2021-06-15 PROBLEM — R53.81 PHYSICAL DECONDITIONING: Status: ACTIVE | Noted: 2017-05-05

## 2021-06-15 PROBLEM — I10 ESSENTIAL HYPERTENSION: Status: ACTIVE | Noted: 2017-04-14

## 2021-06-16 PROBLEM — Z97.8 CHRONIC INDWELLING FOLEY CATHETER: Status: ACTIVE | Noted: 2017-06-16

## 2021-06-16 PROBLEM — R33.9 URINARY RETENTION: Status: ACTIVE | Noted: 2017-06-06
